# Patient Record
Sex: FEMALE | Race: WHITE | NOT HISPANIC OR LATINO | Employment: FULL TIME | URBAN - METROPOLITAN AREA
[De-identification: names, ages, dates, MRNs, and addresses within clinical notes are randomized per-mention and may not be internally consistent; named-entity substitution may affect disease eponyms.]

---

## 2017-01-04 ENCOUNTER — ROUTINE PRENATAL (OUTPATIENT)
Dept: OBGYN | Facility: CLINIC | Age: 42
End: 2017-01-04
Payer: COMMERCIAL

## 2017-01-04 VITALS — BODY MASS INDEX: 33.84 KG/M2 | DIASTOLIC BLOOD PRESSURE: 70 MMHG | WEIGHT: 179 LBS | SYSTOLIC BLOOD PRESSURE: 110 MMHG

## 2017-01-04 DIAGNOSIS — O09.523 AMA (ADVANCED MATERNAL AGE) MULTIGRAVIDA 35+, THIRD TRIMESTER: ICD-10-CM

## 2017-01-04 PROCEDURE — 90040 PR PRENATAL FOLLOW UP: CPT | Performed by: OBSTETRICS & GYNECOLOGY

## 2017-01-04 NOTE — MR AVS SNAPSHOT
Jose Milligan Maryan   2017 3:30 PM   Routine Prenatal   MRN: [id number]    Department:  [facility]   Dept Phone:  [phone]    Description:  Female : 1975   Provider:  Annika Overton M.D.           Allergies as of 2017     Allergen Noted Reactions    Food 2016       [place] milk       You were diagnosed with     AMA (advanced maternal age) multigravida 35+, third trimester   [[id number]]         Vital Signs     Blood Pressure Weight Last Menstrual Period Smoking Status          110/70 mmHg 81.194 kg (179 lb) 2016 Former Smoker        Basic Information     Date Of Birth Sex Race Ethnicity Preferred Language    1975 Female White Non- English      Your appointments     2017  8:30 AM   OB Follow Up with Annika Overton M.D.   [facility] ([address])    [address] [place] [phone]            2017  1:45 PM   OB Follow Up with Silvia Frost M.D.   [facility] ([address])    [address] [place] [address], [address]  [facility] [address] [phone]            2017 10:45 AM   OB Follow Up with Annika Overton M.D.   [facility] ([address])    [address] [place] [address] [address] [place] [phone]            2017  1:30 PM   OB Follow Up with Annika Overton M.D.   [facility] ([address])    [address] [place] [address], [address] [place] [phone]            2017  1:45 PM   OB Follow Up with Annika Overton M.D.   [facility] ([address])    [address] [place] [address], [address] [place] [address] [phone]              Problem List              ICD-10-CM Priority Class Noted - Resolved    Supervision of other normal pregnancy, antepartum Z34.80   2016 - Present    AMA (advanced maternal age) multigravida 35+ O09.529   2016 - Present    Abnormal   AFP screen O28.0   9/21/2016 - Present    Carpal tunnel syndrome during pregnancy O26.899, G56.00   12/19/2016 - Present      Health Maintenance        Date Due Completion Dates    IMM DTaP/Tdap/Td Vaccine (1 - Tdap) 3/8/1995 ---    MAMMOGRAM 3/8/2016 ---    IMM INFLUENZA (1) 9/1/2016 ---    PAP SMEAR 11/8/2019 11/8/2016, 9/13/2016, 9/13/2016            Current Immunizations     No immunizations on file.      Below and/or attached are the medications your provider expects you to take. Review all of your home medications and newly ordered medications with your provider and/or pharmacist. Follow medication instructions as directed by your provider and/or pharmacist. Please keep your medication list with you and share with your provider. Update the information when medications are discontinued, doses are changed, or new medications (including over-the-counter products) are added; and carry medication information at all times in the event of emergency situations     Allergies:  FOOD - (reactions not documented)               Medications  Valid as of: January 04, 2017 -  4:39 PM    Generic Name Brand Name Tablet Size Instructions for use    Acetaminophen (Tab) TYLENOL 325 MG Take 650 mg by mouth every four hours as needed.        Amoxicillin (Cap) AMOXIL 500 MG Take 1 Cap by mouth 3 times a day.        Azithromycin (Tab) ZITHROMAX 250 MG Take  by mouth. 2 tabs by mouth day 1, 1 tab by mouth days 2-5        Hydrocodone-Chlorpheniramine (Liquid CR) TUSSIONEX 8-10 MG/5ML Take 5 mL by mouth every 12 hours as needed for Cough.        Ibuprofen   Take  by mouth. Motrin 200 mg tabs 2 at 11 am        Multiple Vitamins-Minerals (Tab) THERAGRAN-M  Take 1 Tab by mouth every day.        Verapamil HCl (Tab) ISOPTIN 120 MG Take 120 mg by mouth 3 times a day.        .                 Medicines prescribed today were sent to:     [facility] [place] - [address] [place] [address]    Phone: [phone] Fax:  [phone]    Open 24 Hours?: No      Medication refill instructions:       If your prescription bottle indicates you have medication refills left, it is not necessary to call your provider’s office. Please contact your pharmacy and they will refill your medication.    If your prescription bottle indicates you do not have any refills left, you may request refills at any time through one of the following ways: The online Opzi system (except Urgent Care), by calling your provider’s office, or by asking your pharmacy to contact your provider’s office with a refill request. Medication refills are processed only during regular business hours and may not be available until the next business day. Your provider may request additional information or to have a follow-up visit with you prior to refilling your medication.   *Please Note: Medication refills are assigned a new Rx number when refilled electronically. Your pharmacy may indicate that no refills were authorized even though a new prescription for the same medication is available at the pharmacy. Please request the medicine by name with the pharmacy before contacting your provider for a refill.        Other Notes About Your Plan     Baby girl.           Opzi Access Code: Activation code not generated  Current Opzi Status: Active

## 2017-01-13 ENCOUNTER — OFFICE VISIT (OUTPATIENT)
Dept: URGENT CARE | Facility: CLINIC | Age: 42
End: 2017-01-13
Payer: COMMERCIAL

## 2017-01-13 VITALS
WEIGHT: 182 LBS | SYSTOLIC BLOOD PRESSURE: 110 MMHG | RESPIRATION RATE: 16 BRPM | TEMPERATURE: 99 F | BODY MASS INDEX: 34.41 KG/M2 | DIASTOLIC BLOOD PRESSURE: 72 MMHG | HEART RATE: 101 BPM | OXYGEN SATURATION: 98 %

## 2017-01-13 DIAGNOSIS — J06.9 VIRAL UPPER RESPIRATORY ILLNESS: ICD-10-CM

## 2017-01-13 PROCEDURE — 99213 OFFICE O/P EST LOW 20 MIN: CPT | Performed by: FAMILY MEDICINE

## 2017-01-13 ASSESSMENT — ENCOUNTER SYMPTOMS
FEVER: 0
DOUBLE VISION: 0
SHORTNESS OF BREATH: 1
CHILLS: 1
SWOLLEN GLANDS: 0
ABDOMINAL PAIN: 0
NAUSEA: 0
TROUBLE SWALLOWING: 1
COUGH: 1
NECK PAIN: 0
HEADACHES: 0
VOMITING: 0
BLURRED VISION: 0
HOARSE VOICE: 1
DIZZINESS: 0

## 2017-01-13 NOTE — MR AVS SNAPSHOT
Jose Milligan Maryan   2017 10:00 AM   Office Visit   MRN: [id number]    Department:  [facility]   Dept Phone:  [phone]    Description:  Female : 1975   Provider:  Phillip Barroso M.D.           Reason for Visit     Pharyngitis sore throat,cough x 3 days       Allergies as of 2017     Allergen Noted Reactions    Food 2016       [place] milk       You were diagnosed with     Viral upper respiratory illness   [[id number]]         Vital Signs     Blood Pressure Pulse Temperature Respirations Weight Oxygen Saturation    110/72 mmHg 101 37.2 °C (99 °F) 16 82.555 kg (182 lb) 98%    Last Menstrual Period Smoking Status                2016 Former Smoker          Basic Information     Date Of Birth Sex Race Ethnicity Preferred Language    1975 Female White Non- English      Your appointments     2017  1:45 PM   OB Follow Up with Silvia Frost M.D.   [address])    [address] [place] [phone]   [phone]            2017 10:45 AM   OB Follow Up with Annika Overton M.D.   [address])    [address] [place] [phone]   [phone]            2017  1:30 PM   OB Follow Up with Annika Overton M.D.   [address])    [address] [place] [phone]   [phone]            2017  1:45 PM   OB Follow Up with Annika Overton M.D.   [address])    [address] [place] [address]   [phone]              Problem List              ICD-10-CM Priority Class Noted - Resolved    Supervision of other normal pregnancy, antepartum Z34.80   2016 - Present    AMA (advanced maternal age) multigravida 35+ O09.529   2016 - Present    Abnormal  AFP screen O28.0   2016 - Present    Carpal tunnel syndrome  during pregnancy O26.899, G56.00   12/19/2016 - Present      Health Maintenance        Date Due Completion Dates    IMM DTaP/Tdap/Td Vaccine (1 - Tdap) 3/8/1995 ---    MAMMOGRAM 3/8/2016 ---    IMM INFLUENZA (1) 9/1/2016 ---    PAP SMEAR 11/8/2019 11/8/2016, 9/13/2016, 9/13/2016            Current Immunizations     No immunizations on file.      Below and/or attached are the medications your provider expects you to take. Review all of your home medications and newly ordered medications with your provider and/or pharmacist. Follow medication instructions as directed by your provider and/or pharmacist. Please keep your medication list with you and share with your provider. Update the information when medications are discontinued, doses are changed, or new medications (including over-the-counter products) are added; and carry medication information at all times in the event of emergency situations     Allergies:  FOOD - (reactions not documented)               Medications  Valid as of: January 13, 2017 - 10:34 AM    Generic Name Brand Name Tablet Size Instructions for use    Acetaminophen (Tab) TYLENOL 325 MG Take 650 mg by mouth every four hours as needed.        Amoxicillin (Cap) AMOXIL 500 MG Take 1 Cap by mouth 3 times a day.        Azithromycin (Tab) ZITHROMAX 250 MG Take  by mouth. 2 tabs by mouth day 1, 1 tab by mouth days 2-5        Hydrocodone-Chlorpheniramine (Liquid CR) TUSSIONEX 8-10 MG/5ML Take 5 mL by mouth every 12 hours as needed for Cough.        Ibuprofen   Take  by mouth. Motrin 200 mg tabs 2 at 11 am        Multiple Vitamins-Minerals (Tab) THERAGRAN-M  Take 1 Tab by mouth every day.        Prenatal Vit-Fe Fumarate-FA   Take  by mouth.        Verapamil HCl (Tab) ISOPTIN 120 MG Take 120 mg by mouth 3 times a day.        .                 Medicines prescribed today were sent to:     [facility] [place] [facility] [address] [facility]    [address] [facility] [place] [address]    Phone: [phone]  Fax: [phone]    Open 24 Hours?: No      Medication refill instructions:       If your prescription bottle indicates you have medication refills left, it is not necessary to call your provider’s office. Please contact your pharmacy and they will refill your medication.    If your prescription bottle indicates you do not have any refills left, you may request refills at any time through one of the following ways: The online Alum.ni system (except Urgent Care), by calling your provider’s office, or by asking your pharmacy to contact your provider’s office with a refill request. Medication refills are processed only during regular business hours and may not be available until the next business day. Your provider may request additional information or to have a follow-up visit with you prior to refilling your medication.   *Please Note: Medication refills are assigned a new Rx number when refilled electronically. Your pharmacy may indicate that no refills were authorized even though a new prescription for the same medication is available at the pharmacy. Please request the medicine by name with the pharmacy before contacting your provider for a refill.        Other Notes About Your Plan     Baby girl.           Alum.ni Access Code: Activation code not generated  Current Alum.ni Status: Active

## 2017-01-13 NOTE — PROGRESS NOTES
Subjective:      Jose Steinberg is a 41 y.o. female who presents with Pharyngitis            Pharyngitis   This is a new problem. The current episode started in the past 7 days (3 days). The problem has been gradually worsening. Neither side of throat is experiencing more pain than the other. There has been no fever. The pain is at a severity of 7/10. The pain is moderate. Associated symptoms include congestion, coughing, a hoarse voice, a plugged ear sensation, shortness of breath and trouble swallowing. Pertinent negatives include no abdominal pain, ear discharge, ear pain, headaches, neck pain, swollen glands or vomiting. She has had no exposure to strep. She has tried acetaminophen for the symptoms. The treatment provided mild relief.       Review of Systems   Constitutional: Positive for chills. Negative for fever.   HENT: Positive for congestion, hoarse voice and trouble swallowing. Negative for ear discharge and ear pain.    Eyes: Negative for blurred vision and double vision.   Respiratory: Positive for cough and shortness of breath.    Cardiovascular: Negative for chest pain.   Gastrointestinal: Negative for nausea, vomiting and abdominal pain.   Musculoskeletal: Negative for neck pain.   Neurological: Negative for dizziness and headaches.     PMH:  has a past medical history of Renal calculi and Heart valve disorder. She also has no past medical history of Congestive heart failure (HCC), Cancer (HCC), Infectious disease, COPD, Diabetes, ASTHMA, CAD (coronary artery disease), Stroke (HCC), Seizure disorder (HCC), Hypertension, Liver disease, or Psychiatric disorder.  MEDS:   Current outpatient prescriptions:   •  Prenatal Vit-Fe Fumarate-FA (PRENAPLUS PO), Take  by mouth., Disp: , Rfl:   •  amoxicillin (AMOXIL) 500 MG Cap, Take 1 Cap by mouth 3 times a day., Disp: 30 Cap, Rfl: 0  •  acetaminophen (TYLENOL) 325 MG Tab, Take 650 mg by mouth every four hours as needed., Disp: , Rfl:   •  therapeutic  multivitamin-minerals (THERAGRAN-M) Tab, Take 1 Tab by mouth every day., Disp: , Rfl:   •  verapamil (ISOPTIN) 120 MG TABS, Take 120 mg by mouth 3 times a day., Disp: , Rfl:   •  azithromycin (ZITHROMAX) 250 MG TABS, Take  by mouth. 2 tabs by mouth day 1, 1 tab by mouth days 2-5, Disp: 6 Each, Rfl: 0  •  chlorpheniramine-hydrocodone (TUSSIONEX) 8-10 MG/5ML suspension, Take 5 mL by mouth every 12 hours as needed for Cough., Disp: 50 mL, Rfl: 0  •  MOTRIN PO, Take  by mouth. Motrin 200 mg tabs 2 at 11 am, Disp: , Rfl:   ALLERGIES:   Allergies   Allergen Reactions   • Food      [place] milk      SURGHX:   Past Surgical History   Procedure Laterality Date   • Gyn surgery       freq yeast infections   • Other       kidney stones removed 2003     SOCHX:  reports that she has quit smoking. She does not have any smokeless tobacco history on file. She reports that she does not drink alcohol or use illicit drugs.  FH: Family history was reviewed, no pertinent findings to report       Objective:     /72 mmHg  Pulse 101  Temp(Src) 37.2 °C (99 °F)  Resp 16  Wt 82.555 kg (182 lb)  SpO2 98%  LMP 05/28/2016     Physical Exam   Constitutional: She appears well-developed.   HENT:   Head: Normocephalic.   Right Ear: External ear normal.   Left Ear: External ear normal.   Mouth/Throat: Oropharyngeal exudate present.   Nasal congestion   Eyes: Pupils are equal, round, and reactive to light. Right eye exhibits no discharge. Left eye exhibits no discharge.   Neck: Neck supple. No thyromegaly present.   Cardiovascular: Normal rate.  Exam reveals no friction rub.    No murmur heard.  Pulmonary/Chest: Effort normal. No respiratory distress. She has no wheezes.   Abdominal: Soft. She exhibits no distension. There is no tenderness. There is no guarding.   Lymphadenopathy:     She has no cervical adenopathy.   Neurological: She is alert.   Skin: Skin is warm and dry. No erythema.   Psychiatric: She has a normal mood and affect. Her  behavior is normal.               Assessment/Plan:     1. Viral upper respiratory illness       Supportive care  Push fluids  OTC tylenol  Monitor temperature  Follow-up if symptoms worsen or fail to improve

## 2017-01-20 ENCOUNTER — ROUTINE PRENATAL (OUTPATIENT)
Dept: OBGYN | Facility: CLINIC | Age: 42
End: 2017-01-20
Payer: COMMERCIAL

## 2017-01-20 ENCOUNTER — HOSPITAL ENCOUNTER (OUTPATIENT)
Facility: MEDICAL CENTER | Age: 42
End: 2017-01-20
Attending: OBSTETRICS & GYNECOLOGY
Payer: COMMERCIAL

## 2017-01-20 VITALS — WEIGHT: 180 LBS | DIASTOLIC BLOOD PRESSURE: 72 MMHG | BODY MASS INDEX: 34.03 KG/M2 | SYSTOLIC BLOOD PRESSURE: 114 MMHG

## 2017-01-20 DIAGNOSIS — Z34.83 PRENATAL CARE, SUBSEQUENT PREGNANCY, THIRD TRIMESTER: ICD-10-CM

## 2017-01-20 PROCEDURE — 90040 PR PRENATAL FOLLOW UP: CPT | Performed by: OBSTETRICS & GYNECOLOGY

## 2017-01-20 PROCEDURE — 87653 STREP B DNA AMP PROBE: CPT

## 2017-01-20 NOTE — MR AVS SNAPSHOT
Jose Milligan Maryan   2017 1:45 PM   Routine Prenatal   MRN: [id number]    Department:  [facility]   Dept Phone:  [phone]    Description:  Female : 1975   Provider:  Silvia Frost M.D.           Allergies as of 2017     Allergen Noted Reactions    Food 2016       [place] milk       You were diagnosed with     Prenatal care, subsequent pregnancy, third trimester   [814069]         Vital Signs     Blood Pressure Weight Last Menstrual Period Smoking Status          114/72 mmHg 81.647 kg (180 lb) 2016 Former Smoker        Basic Information     Date Of Birth Sex Race Ethnicity Preferred Language    1975 Female White Non- English      Your appointments     2017 10:45 AM   OB Follow Up with Annika Overton M.D.   [facility] ([address])    [address] [place] [phone]            2017  1:30 PM   OB Follow Up with Annika Overton M.D.   [facility] ([address])    [address] [place] [address], [address] [phone]            2017  1:45 PM   OB Follow Up with Annika Overton M.D.   [facility] ([address])    [address] [place] [address], [address] [phone]              Problem List              ICD-10-CM Priority Class Noted - Resolved    Supervision of other normal pregnancy, antepartum Z34.80   2016 - Present    AMA (advanced maternal age) multigravida 35+ O09.529   2016 - Present    Abnormal  AFP screen O28.0   2016 - Present    Carpal tunnel syndrome during pregnancy O26.899, G56.00   2016 - Present      Health Maintenance        Date Due Completion Dates    IMM DTaP/Tdap/Td Vaccine (1 - Tdap) 3/8/1995 ---    MAMMOGRAM 3/8/2016 ---    IMM INFLUENZA (1) 2016 ---    PAP SMEAR 2019, 2016, 2016            Current Immunizations     No  immunizations on file.      Below and/or attached are the medications your provider expects you to take. Review all of your home medications and newly ordered medications with your provider and/or pharmacist. Follow medication instructions as directed by your provider and/or pharmacist. Please keep your medication list with you and share with your provider. Update the information when medications are discontinued, doses are changed, or new medications (including over-the-counter products) are added; and carry medication information at all times in the event of emergency situations     Allergies:  FOOD - (reactions not documented)               Medications  Valid as of: January 20, 2017 -  2:32 PM    Generic Name Brand Name Tablet Size Instructions for use    Acetaminophen (Tab) TYLENOL 325 MG Take 650 mg by mouth every four hours as needed.        Amoxicillin (Cap) AMOXIL 500 MG Take 1 Cap by mouth 3 times a day.        Azithromycin (Tab) ZITHROMAX 250 MG Take  by mouth. 2 tabs by mouth day 1, 1 tab by mouth days 2-5        Hydrocodone-Chlorpheniramine (Liquid CR) TUSSIONEX 8-10 MG/5ML Take 5 mL by mouth every 12 hours as needed for Cough.        Ibuprofen   Take  by mouth. Motrin 200 mg tabs 2 at 11 am        Multiple Vitamins-Minerals (Tab) THERAGRAN-M  Take 1 Tab by mouth every day.        Prenatal Vit-Fe Fumarate-FA   Take  by mouth.        Verapamil HCl (Tab) ISOPTIN 120 MG Take 120 mg by mouth 3 times a day.        .                 Medicines prescribed today were sent to:     [facility] [address] [place] [address] [address]     [address] [facility] [address]    Phone: [phone] Fax: [phone]    Open 24 Hours?: No      Medication refill instructions:       If your prescription bottle indicates you have medication refills left, it is not necessary to call your provider’s office. Please contact your pharmacy and they will refill your medication.    If your prescription bottle indicates you do not have any  refills left, you may request refills at any time through one of the following ways: The online HomeStars system (except Urgent Care), by calling your provider’s office, or by asking your pharmacy to contact your provider’s office with a refill request. Medication refills are processed only during regular business hours and may not be available until the next business day. Your provider may request additional information or to have a follow-up visit with you prior to refilling your medication.   *Please Note: Medication refills are assigned a new Rx number when refilled electronically. Your pharmacy may indicate that no refills were authorized even though a new prescription for the same medication is available at the pharmacy. Please request the medicine by name with the pharmacy before contacting your provider for a refill.        Your To Do List     Future Labs/Procedures Complete By Expires    GRP B STREP, BY PCR (PALMA BROTH)  As directed 1/20/2018      Other Notes About Your Plan     Baby girl.           HomeStars Access Code: Activation code not generated  Current HomeStars Status: Active

## 2017-01-21 LAB — GP B STREP DNA SPEC QL NAA+PROBE: NEGATIVE

## 2017-01-25 ENCOUNTER — ROUTINE PRENATAL (OUTPATIENT)
Dept: OBGYN | Facility: CLINIC | Age: 42
End: 2017-01-25
Payer: COMMERCIAL

## 2017-01-25 ENCOUNTER — TELEPHONE (OUTPATIENT)
Dept: OBGYN | Facility: CLINIC | Age: 42
End: 2017-01-25

## 2017-01-25 VITALS — DIASTOLIC BLOOD PRESSURE: 80 MMHG | BODY MASS INDEX: 34.41 KG/M2 | SYSTOLIC BLOOD PRESSURE: 110 MMHG | WEIGHT: 182 LBS

## 2017-01-25 LAB
NST ACOUSTIC STIMULATION: NORMAL
NST ACTION NECESSARY: NORMAL
NST ASSESSMENT: NORMAL
NST BASELINE: NORMAL
NST INDICATIONS: NORMAL
NST OTHER DATA: NORMAL
NST READ BY: NORMAL
NST RETURN: NORMAL
NST UTERINE ACTIVITY: NORMAL

## 2017-01-25 PROCEDURE — 90040 PR PRENATAL FOLLOW UP: CPT | Performed by: OBSTETRICS & GYNECOLOGY

## 2017-01-25 PROCEDURE — 59025 FETAL NON-STRESS TEST: CPT | Performed by: OBSTETRICS & GYNECOLOGY

## 2017-01-25 NOTE — MR AVS SNAPSHOT
Jose Milligan Maryan   2017 10:45 AM   Routine Prenatal   MRN: [id number]    Department:  [facility]   Dept Phone:  [phone]    Description:  Female : 1975   Provider:  Annika Overton M.D.           Allergies as of 2017     Allergen Noted Reactions    Food 2016       [place] milk       You were diagnosed with     SGA (small for gestational age)   [118114]         Vital Signs     Blood Pressure Weight Last Menstrual Period Smoking Status          110/80 mmHg 82.555 kg (182 lb) 2016 Former Smoker        Basic Information     Date Of Birth Sex Race Ethnicity Preferred Language    1975 Female White Non- English      Your appointments     2017  9:00 AM   Fetal Non-Stress Test with OBGYN E2 NST ROOM   [facility] ([address])    [address] [place] [address]   [phone]            2017  1:00 PM   Fetal Non-Stress Test with OBGYN E2 NST ROOM   [facility] ([address])    [address] [place] [address]   [phone]            2017  1:30 PM   OB Follow Up with Annika Overton M.D.   [facility] ([address])    [address] [place] [id number] [phone]            2017 10:00 AM   Fetal Non-Stress Test with OBGYN E2 NST ROOM   [facility] ([address])    [address] [place] [address], [address] [place] [address]   [phone]            2017  1:00 PM   Fetal Non-Stress Test with OBGYN E2 NST ROOM   [facility] ([address])    [address] [place] [address], [address] [place] [address]   [phone]            2017  1:45 PM   OB Follow Up with Annika Overton M.D.   [facility] ([address])    [address] [place] [address] [address] [place] [id number] [phone]              Problem List              ICD-10-CM Priority Class Noted  - Resolved    Supervision of other normal pregnancy, antepartum Z34.80   2016 - Present    AMA (advanced maternal age) multigravida 35+ O09.529   2016 - Present    Abnormal  AFP screen O28.0   2016 - Present    Carpal tunnel syndrome during pregnancy O26.899, G56.00   2016 - Present    SGA (small for gestational age) P05.00   2017 - Present      Health Maintenance        Date Due Completion Dates    IMM DTaP/Tdap/Td Vaccine (1 - Tdap) 3/8/1995 ---    MAMMOGRAM 3/8/2016 ---    IMM INFLUENZA (1) 2016 ---    PAP SMEAR 2019, 2016, 2016            Current Immunizations     No immunizations on file.      Below and/or attached are the medications your provider expects you to take. Review all of your home medications and newly ordered medications with your provider and/or pharmacist. Follow medication instructions as directed by your provider and/or pharmacist. Please keep your medication list with you and share with your provider. Update the information when medications are discontinued, doses are changed, or new medications (including over-the-counter products) are added; and carry medication information at all times in the event of emergency situations     Allergies:  FOOD - (reactions not documented)               Medications  Valid as of: 2017 - 11:52 AM    Generic Name Brand Name Tablet Size Instructions for use    Acetaminophen (Tab) TYLENOL 325 MG Take 650 mg by mouth every four hours as needed.        Amoxicillin (Cap) AMOXIL 500 MG Take 1 Cap by mouth 3 times a day.        Azithromycin (Tab) ZITHROMAX 250 MG Take  by mouth. 2 tabs by mouth day 1, 1 tab by mouth days 2-5        Hydrocodone-Chlorpheniramine (Liquid CR) TUSSIONEX 8-10 MG/5ML Take 5 mL by mouth every 12 hours as needed for Cough.        Ibuprofen   Take  by mouth. Motrin 200 mg tabs 2 at 11 am        Multiple Vitamins-Minerals (Tab) THERAGRAN-M  Take 1 Tab by mouth every day.          Prenatal Vit-Fe Fumarate-FA   Take  by mouth.        Verapamil HCl (Tab) ISOPTIN 120 MG Take 120 mg by mouth 3 times a day.        .                 Medicines prescribed today were sent to:     [facility] [address] [facility], [place] - [address] [place] [address]    Phone: [phone] Fax: [phone]    Open 24 Hours?: No      Medication refill instructions:       If your prescription bottle indicates you have medication refills left, it is not necessary to call your provider’s office. Please contact your pharmacy and they will refill your medication.    If your prescription bottle indicates you do not have any refills left, you may request refills at any time through one of the following ways: The online [employer] system (except Urgent Care), by calling your provider’s office, or by asking your pharmacy to contact your provider’s office with a refill request. Medication refills are processed only during regular business hours and may not be available until the next business day. Your provider may request additional information or to have a follow-up visit with you prior to refilling your medication.   *Please Note: Medication refills are assigned a new Rx number when refilled electronically. Your pharmacy may indicate that no refills were authorized even though a new prescription for the same medication is available at the pharmacy. Please request the medicine by name with the pharmacy before contacting your provider for a refill.        Your To Do List     Future Labs/Procedures Complete By Expires    INDUCTION OF LABOR  As directed 1/25/2018      Other Notes About Your Plan     Baby girl.           [employer] Access Code: Activation code not generated  Current [employer] Status: Active

## 2017-01-25 NOTE — TELEPHONE ENCOUNTER
Called [facility] to schedule weekly fluid checks, dopplers, and NST's for patient (IUGR) per Dr. Overton's request.  Was informed that they would need to check with the provider to ask if she could still be seen. Patient has had 3 no shows.  [facility] will call the patient today or tomorrow per Teresa.

## 2017-01-25 NOTE — PROGRESS NOTES
41 y.o.  37w3d The patient is here for routine obstetrical followup. She is without complaints and reports good fetal movement. She denies contractions, vaginal bleeding, or leaking of fluid.    The patient's pregnancy is complicated by   Patient Active Problem List    Diagnosis Date Noted   • SGA (small for gestational age) 2017   • Carpal tunnel syndrome during pregnancy 2016   • Supervision of other normal pregnancy, antepartum 2016   • AMA (advanced maternal age) multigravida 35+ 2016   • Abnormal  AFP screen 2016     Has not followed up with [facility] for weekly dopplers, NST, and KALI.  Was scheduled at [facility] for NSTs, but did not follow up when she changed to this location.     NST today. Will place order for IOL for 39 wks.     Followup in 1 week  Labor precautions were discussed with patient  Fetal kick counts were discussed with patient

## 2017-01-27 ENCOUNTER — TELEPHONE (OUTPATIENT)
Dept: OBGYN | Facility: CLINIC | Age: 42
End: 2017-01-27

## 2017-01-31 ENCOUNTER — ROUTINE PRENATAL (OUTPATIENT)
Dept: OBGYN | Facility: CLINIC | Age: 42
End: 2017-01-31
Payer: COMMERCIAL

## 2017-01-31 LAB
NST ACOUSTIC STIMULATION: NO
NST ACTION NECESSARY: NORMAL
NST ASSESSMENT: REACTIVE
NST BASELINE: 140
NST INDICATIONS: NORMAL
NST OTHER DATA: NORMAL
NST READ BY: NORMAL
NST RETURN: NORMAL
NST UTERINE ACTIVITY: NORMAL

## 2017-01-31 PROCEDURE — 59025 FETAL NON-STRESS TEST: CPT | Performed by: OBSTETRICS & GYNECOLOGY

## 2017-01-31 NOTE — MR AVS SNAPSHOT
Jose Steinberg   2017 9:00 AM   Routine Prenatal   MRN: [id number]    Department:  [facility]   Dept Phone:  [phone]    Description:  Female : 1975   Provider:  Arvind Quiñonez M.D.           Allergies as of 2017     Allergen Noted Reactions    Food 2016       [place] milk       You were diagnosed with     SGA (small for gestational age)   [454824]         Vital Signs     Last Menstrual Period Smoking Status                2016 Former Smoker          Basic Information     Date Of Birth Sex Race Ethnicity Preferred Language    1975 Female White Non- English      Your appointments     2017  1:00 PM   Fetal Non-Stress Test with OBGYN E2 NST ROOM   [facility] ([address])    [address] [place] [phone]            2017  1:30 PM   OB Follow Up with Annika Overton M.D.   [facility] ([address])    [address] [place] [phone] [phone]            2017  8:00 PM   TPC INDUCTION OF LABOR with NON-SURGICAL L&D   LABOR & DELIVERY [facility] (--)    [address] [facility] [address]   [phone]            2017 10:00 AM   Fetal Non-Stress Test with OBGYN E2 NST ROOM   [facility] ([address])    [address] [place] [address], [address] [phone]            2017  1:00 PM   Fetal Non-Stress Test with OBGYN E2 NST ROOM   [facility] ([address])    [address] [place] [phone]            2017  2:00 PM   OB Follow Up with Annika Overton M.D.   [facility] ([address])    [address] [place] [phone]              Problem List              ICD-10-CM Priority Class Noted - Resolved    Supervision of other normal pregnancy, antepartum Z34.80    2016 - Present    AMA (advanced maternal age) multigravida 35+ O09.529   2016 - Present    Abnormal  AFP screen O28.0   2016 - Present    Carpal tunnel syndrome during pregnancy O26.899, G56.00   2016 - Present    SGA (small for gestational age) P05.00   2017 - Present      Health Maintenance        Date Due Completion Dates    IMM DTaP/Tdap/Td Vaccine (1 - Tdap) 3/8/1995 ---    MAMMOGRAM 3/8/2016 ---    IMM INFLUENZA (1) 2016 ---    PAP SMEAR 2019, 2016, 2016            Current Immunizations     No immunizations on file.      Below and/or attached are the medications your provider expects you to take. Review all of your home medications and newly ordered medications with your provider and/or pharmacist. Follow medication instructions as directed by your provider and/or pharmacist. Please keep your medication list with you and share with your provider. Update the information when medications are discontinued, doses are changed, or new medications (including over-the-counter products) are added; and carry medication information at all times in the event of emergency situations     Allergies:  FOOD - (reactions not documented)               Medications  Valid as of: 2017 -  9:47 AM    Generic Name Brand Name Tablet Size Instructions for use    Acetaminophen (Tab) TYLENOL 325 MG Take 650 mg by mouth every four hours as needed.        Amoxicillin (Cap) AMOXIL 500 MG Take 1 Cap by mouth 3 times a day.        Azithromycin (Tab) ZITHROMAX 250 MG Take  by mouth. 2 tabs by mouth day 1, 1 tab by mouth days 2-5        Hydrocodone-Chlorpheniramine (Liquid CR) TUSSIONEX 8-10 MG/5ML Take 5 mL by mouth every 12 hours as needed for Cough.        Ibuprofen   Take  by mouth. Motrin 200 mg tabs 2 at 11 am        Multiple Vitamins-Minerals (Tab) THERAGRAN-M  Take 1 Tab by mouth every day.        Prenatal Vit-Fe Fumarate-FA   Take  by mouth.        Verapamil HCl  (Tab) ISOPTIN 120 MG Take 120 mg by mouth 3 times a day.        .                 Medicines prescribed today were sent to:     [facility] [address] [facility], [place] - [address] [place] [address]    Phone: [phone] Fax: [phone]    Open 24 Hours?: No      Medication refill instructions:       If your prescription bottle indicates you have medication refills left, it is not necessary to call your provider’s office. Please contact your pharmacy and they will refill your medication.    If your prescription bottle indicates you do not have any refills left, you may request refills at any time through one of the following ways: The online [employer] system (except Urgent Care), by calling your provider’s office, or by asking your pharmacy to contact your provider’s office with a refill request. Medication refills are processed only during regular business hours and may not be available until the next business day. Your provider may request additional information or to have a follow-up visit with you prior to refilling your medication.   *Please Note: Medication refills are assigned a new Rx number when refilled electronically. Your pharmacy may indicate that no refills were authorized even though a new prescription for the same medication is available at the pharmacy. Please request the medicine by name with the pharmacy before contacting your provider for a refill.        Other Notes About Your Plan     Baby girl.           [employer] Access Code: Activation code not generated  Current [employer] Status: Active

## 2017-02-02 ENCOUNTER — ROUTINE PRENATAL (OUTPATIENT)
Dept: OBGYN | Facility: CLINIC | Age: 42
End: 2017-02-02
Payer: COMMERCIAL

## 2017-02-02 VITALS — BODY MASS INDEX: 34.22 KG/M2 | WEIGHT: 181 LBS | DIASTOLIC BLOOD PRESSURE: 62 MMHG | SYSTOLIC BLOOD PRESSURE: 118 MMHG

## 2017-02-02 DIAGNOSIS — O09.523 AMA (ADVANCED MATERNAL AGE) MULTIGRAVIDA 35+, THIRD TRIMESTER: ICD-10-CM

## 2017-02-02 PROCEDURE — 90040 PR PRENATAL FOLLOW UP: CPT | Performed by: OBSTETRICS & GYNECOLOGY

## 2017-02-02 PROCEDURE — 59025 FETAL NON-STRESS TEST: CPT | Performed by: OBSTETRICS & GYNECOLOGY

## 2017-02-02 NOTE — PROGRESS NOTES
41 y.o.  38w4d The patient is here for routine obstetrical followup. She is without complaints and reports good fetal movement. She denies contractions, vaginal bleeding, or leaking of fluid.    The patient's pregnancy is complicated by   Patient Active Problem List    Diagnosis Date Noted   • SGA (small for gestational age) 2017   • Carpal tunnel syndrome during pregnancy 2016   • Supervision of other normal pregnancy, antepartum 2016   • AMA (advanced maternal age) multigravida 35+ 2016   • Abnormal  AFP screen 2016     Followed for SGA per [facility] earlier in pregnancy.  Pt was noncompliant and missed 3 visits with them and they refuse to see her back.  Unable to get f/u growth scan.    Limited US performed for KALI today.  Position cephalic.  KALI = 10.1 cm    Scheduled for IOL due to SGA/possible IUGR on 17 at 8 pm.     Followup for IOL  Labor precautions were discussed with patient  Fetal kick counts were discussed with patient

## 2017-02-02 NOTE — MR AVS SNAPSHOT
Jose Milligan Maryan   2017 1:30 PM   Routine Prenatal   MRN: [id number]    Department:  [facility]   Dept Phone:  [phone]    Description:  Female : 1975   Provider:  Annika Overton M.D.           Allergies as of 2017     Allergen Noted Reactions    Food 2016       [place] milk       You were diagnosed with     AMA (advanced maternal age) multigravida 35+, third trimester   [[id number]]         Vital Signs     Blood Pressure Weight Last Menstrual Period Smoking Status          118/62 mmHg 82.101 kg (181 lb) 2016 Former Smoker        Basic Information     Date Of Birth Sex Race Ethnicity Preferred Language    1975 Female White Non- English      Your appointments     2017  8:00 PM   TPC INDUCTION OF LABOR with NON-SURGICAL L&D   [facility]    [address] [place] [id number] [phone]              Problem List              ICD-10-CM Priority Class Noted - Resolved    Supervision of other normal pregnancy, antepartum Z34.80   2016 - Present    AMA (advanced maternal age) multigravida 35+ O09.529   2016 - Present    Abnormal  AFP screen O28.0   2016 - Present    Carpal tunnel syndrome during pregnancy O26.899, G56.00   2016 - Present    SGA (small for gestational age) P05.00   2017 - Present      Health Maintenance        Date Due Completion Dates    IMM DTaP/Tdap/Td Vaccine (1 - Tdap) 3/8/1995 ---    MAMMOGRAM 3/8/2016 ---    IMM INFLUENZA (1) 2016 ---    PAP SMEAR 2019, 2016, 2016            Current Immunizations     No immunizations on file.      Below and/or attached are the medications your provider expects you to take. Review all of your home medications and newly ordered medications with your provider and/or pharmacist. Follow medication instructions as directed by your provider and/or pharmacist. Please keep your medication list with you and share with your  provider. Update the information when medications are discontinued, doses are changed, or new medications (including over-the-counter products) are added; and carry medication information at all times in the event of emergency situations     Allergies:  FOOD - (reactions not documented)               Medications  Valid as of: February 02, 2017 -  3:21 PM    Generic Name Brand Name Tablet Size Instructions for use    Acetaminophen (Tab) TYLENOL 325 MG Take 650 mg by mouth every four hours as needed.        Amoxicillin (Cap) AMOXIL 500 MG Take 1 Cap by mouth 3 times a day.        Azithromycin (Tab) ZITHROMAX 250 MG Take  by mouth. 2 tabs by mouth day 1, 1 tab by mouth days 2-5        Hydrocodone-Chlorpheniramine (Liquid CR) TUSSIONEX 8-10 MG/5ML Take 5 mL by mouth every 12 hours as needed for Cough.        Ibuprofen   Take  by mouth. Motrin 200 mg tabs 2 at 11 am        Multiple Vitamins-Minerals (Tab) THERAGRAN-M  Take 1 Tab by mouth every day.        Prenatal Vit-Fe Fumarate-FA   Take  by mouth.        Verapamil HCl (Tab) ISOPTIN 120 MG Take 120 mg by mouth 3 times a day.        .                 Medicines prescribed today were sent to:     [facility] [address] [place] [address] - [address] [facility]    [address] [place] [address]    Phone: [phone] Fax: [phone]    Open 24 Hours?: No      Medication refill instructions:       If your prescription bottle indicates you have medication refills left, it is not necessary to call your provider’s office. Please contact your pharmacy and they will refill your medication.    If your prescription bottle indicates you do not have any refills left, you may request refills at any time through one of the following ways: The online [employer] system (except Urgent Care), by calling your provider’s office, or by asking your pharmacy to contact your provider’s office with a refill request. Medication refills are processed only during regular business hours and may not be available until  the next business day. Your provider may request additional information or to have a follow-up visit with you prior to refilling your medication.   *Please Note: Medication refills are assigned a new Rx number when refilled electronically. Your pharmacy may indicate that no refills were authorized even though a new prescription for the same medication is available at the pharmacy. Please request the medicine by name with the pharmacy before contacting your provider for a refill.        Other Notes About Your Plan     Baby girl.           [employer] Access Code: Activation code not generated  Current [employer] Status: Active

## 2017-02-02 NOTE — MR AVS SNAPSHOT
Jose Milligan Maryan   2017 1:00 PM   Routine Prenatal   MRN: [id number]    Department:  [facility]   Dept Phone:  [phone]    Description:  Female : 1975   Provider:  Annika Overton M.D.           Allergies as of 2017     Allergen Noted Reactions    Food 2016       [place] milk       You were diagnosed with     SGA (small for gestational age)   [783782]         Vital Signs     Last Menstrual Period Smoking Status                2016 Former Smoker          Basic Information     Date Of Birth Sex Race Ethnicity Preferred Language    1975 Female White Non- English      Your appointments     2017  8:00 PM   TPC INDUCTION OF LABOR with NON-SURGICAL L&D   [facility])    [address] [place] [address] [phone]              Problem List              ICD-10-CM Priority Class Noted - Resolved    Supervision of other normal pregnancy, antepartum Z34.80   2016 - Present    AMA (advanced maternal age) multigravida 35+ O09.529   2016 - Present    Abnormal  AFP screen O28.0   2016 - Present    Carpal tunnel syndrome during pregnancy O26.899, G56.00   2016 - Present    SGA (small for gestational age) P05.00   2017 - Present      Health Maintenance        Date Due Completion Dates    IMM DTaP/Tdap/Td Vaccine (1 - Tdap) 3/8/1995 ---    MAMMOGRAM 3/8/2016 ---    IMM INFLUENZA (1) 2016 ---    PAP SMEAR 2019, 2016, 2016            Results     POCT Fetal Nonstress Test      Component    NST Indications    SGA    NST Baseline    120s    NST Uterine Activity    none    NST Acoustic Stimulation    NST Assessment    Cat 1    NST Action Necessary    NST Other Data    NST Return    NST Read By    Tian                        Current Immunizations     No immunizations on file.      Below and/or attached are the medications your provider expects you to take. Review all of your home  medications and newly ordered medications with your provider and/or pharmacist. Follow medication instructions as directed by your provider and/or pharmacist. Please keep your medication list with you and share with your provider. Update the information when medications are discontinued, doses are changed, or new medications (including over-the-counter products) are added; and carry medication information at all times in the event of emergency situations     Allergies:  FOOD - (reactions not documented)               Medications  Valid as of: February 02, 2017 -  3:20 PM    Generic Name Brand Name Tablet Size Instructions for use    Acetaminophen (Tab) TYLENOL 325 MG Take 650 mg by mouth every four hours as needed.        Amoxicillin (Cap) AMOXIL 500 MG Take 1 Cap by mouth 3 times a day.        Azithromycin (Tab) ZITHROMAX 250 MG Take  by mouth. 2 tabs by mouth day 1, 1 tab by mouth days 2-5        Hydrocodone-Chlorpheniramine (Liquid CR) TUSSIONEX 8-10 MG/5ML Take 5 mL by mouth every 12 hours as needed for Cough.        Ibuprofen   Take  by mouth. Motrin 200 mg tabs 2 at 11 am        Multiple Vitamins-Minerals (Tab) THERAGRAN-M  Take 1 Tab by mouth every day.        Prenatal Vit-Fe Fumarate-FA   Take  by mouth.        Verapamil HCl (Tab) ISOPTIN 120 MG Take 120 mg by mouth 3 times a day.        .                 Medicines prescribed today were sent to:     [facility] [address] [place], [place] [address] [address] [facility] [place] [address]    Phone: [phone] Fax: [phone]    Open 24 Hours?: No      Medication refill instructions:       If your prescription bottle indicates you have medication refills left, it is not necessary to call your provider’s office. Please contact your pharmacy and they will refill your medication.    If your prescription bottle indicates you do not have any refills left, you may request refills at any time through one of the following ways: The online [employer] system (except Urgent  Care), by calling your provider’s office, or by asking your pharmacy to contact your provider’s office with a refill request. Medication refills are processed only during regular business hours and may not be available until the next business day. Your provider may request additional information or to have a follow-up visit with you prior to refilling your medication.   *Please Note: Medication refills are assigned a new Rx number when refilled electronically. Your pharmacy may indicate that no refills were authorized even though a new prescription for the same medication is available at the pharmacy. Please request the medicine by name with the pharmacy before contacting your provider for a refill.        Other Notes About Your Plan     Baby girl.           [employer] Access Code: Activation code not generated  Current [employer] Status: Active

## 2017-02-05 ENCOUNTER — HOSPITAL ENCOUNTER (INPATIENT)
Facility: MEDICAL CENTER | Age: 42
LOS: 2 days | End: 2017-02-07
Attending: OBSTETRICS & GYNECOLOGY | Admitting: OBSTETRICS & GYNECOLOGY
Payer: COMMERCIAL

## 2017-02-05 PROBLEM — Z91.199 NONCOMPLIANT PREGNANT PATIENT IN THIRD TRIMESTER: Status: ACTIVE | Noted: 2017-02-05

## 2017-02-05 PROBLEM — O09.893 NONCOMPLIANT PREGNANT PATIENT IN THIRD TRIMESTER: Status: ACTIVE | Noted: 2017-02-05

## 2017-02-05 LAB
BASOPHILS # BLD AUTO: 0.6 % (ref 0–1.8)
BASOPHILS # BLD: 0.08 K/UL (ref 0–0.12)
EOSINOPHIL # BLD AUTO: 0.12 K/UL (ref 0–0.51)
EOSINOPHIL NFR BLD: 0.9 % (ref 0–6.9)
ERYTHROCYTE [DISTWIDTH] IN BLOOD BY AUTOMATED COUNT: 46.3 FL (ref 35.9–50)
HCT VFR BLD AUTO: 39.7 % (ref 37–47)
HGB BLD-MCNC: 14.1 G/DL (ref 12–16)
HOLDING TUBE BB 8507: NORMAL
IMM GRANULOCYTES # BLD AUTO: 0.12 K/UL (ref 0–0.11)
IMM GRANULOCYTES NFR BLD AUTO: 0.9 % (ref 0–0.9)
LYMPHOCYTES # BLD AUTO: 2.18 K/UL (ref 1–4.8)
LYMPHOCYTES NFR BLD: 16.5 % (ref 22–41)
MCH RBC QN AUTO: 32.7 PG (ref 27–33)
MCHC RBC AUTO-ENTMCNC: 35.5 G/DL (ref 33.6–35)
MCV RBC AUTO: 92.1 FL (ref 81.4–97.8)
MONOCYTES # BLD AUTO: 0.9 K/UL (ref 0–0.85)
MONOCYTES NFR BLD AUTO: 6.8 % (ref 0–13.4)
NEUTROPHILS # BLD AUTO: 9.83 K/UL (ref 2–7.15)
NEUTROPHILS NFR BLD: 74.3 % (ref 44–72)
NRBC # BLD AUTO: 0 K/UL
NRBC BLD AUTO-RTO: 0 /100 WBC
PLATELET # BLD AUTO: 286 K/UL (ref 164–446)
PMV BLD AUTO: 9.3 FL (ref 9–12.9)
RBC # BLD AUTO: 4.31 M/UL (ref 4.2–5.4)
WBC # BLD AUTO: 13.2 K/UL (ref 4.8–10.8)

## 2017-02-05 PROCEDURE — 700111 HCHG RX REV CODE 636 W/ 250 OVERRIDE (IP): Performed by: OBSTETRICS & GYNECOLOGY

## 2017-02-05 PROCEDURE — 85025 COMPLETE CBC W/AUTO DIFF WBC: CPT

## 2017-02-05 PROCEDURE — 770002 HCHG ROOM/CARE - OB PRIVATE (112)

## 2017-02-05 PROCEDURE — 3E033VJ INTRODUCTION OF OTHER HORMONE INTO PERIPHERAL VEIN, PERCUTANEOUS APPROACH: ICD-10-PCS | Performed by: OBSTETRICS & GYNECOLOGY

## 2017-02-05 RX ORDER — MISOPROSTOL 200 UG/1
800 TABLET ORAL
Status: DISCONTINUED | OUTPATIENT
Start: 2017-02-05 | End: 2017-02-06 | Stop reason: HOSPADM

## 2017-02-05 RX ORDER — METHYLERGONOVINE MALEATE 0.2 MG/ML
0.2 INJECTION INTRAVENOUS
Status: DISCONTINUED | OUTPATIENT
Start: 2017-02-05 | End: 2017-02-06 | Stop reason: HOSPADM

## 2017-02-05 RX ORDER — SODIUM CHLORIDE, SODIUM LACTATE, POTASSIUM CHLORIDE, CALCIUM CHLORIDE 600; 310; 30; 20 MG/100ML; MG/100ML; MG/100ML; MG/100ML
INJECTION, SOLUTION INTRAVENOUS CONTINUOUS
Status: DISCONTINUED | OUTPATIENT
Start: 2017-02-05 | End: 2017-02-06

## 2017-02-05 RX ORDER — ALUMINA, MAGNESIA, AND SIMETHICONE 2400; 2400; 240 MG/30ML; MG/30ML; MG/30ML
30 SUSPENSION ORAL EVERY 6 HOURS PRN
Status: DISCONTINUED | OUTPATIENT
Start: 2017-02-05 | End: 2017-02-06 | Stop reason: HOSPADM

## 2017-02-05 RX ORDER — HYDROXYZINE 50 MG/1
50 TABLET, FILM COATED ORAL EVERY 6 HOURS PRN
Status: DISCONTINUED | OUTPATIENT
Start: 2017-02-05 | End: 2017-02-06 | Stop reason: HOSPADM

## 2017-02-05 RX ORDER — TERBUTALINE SULFATE 1 MG/ML
0.25 INJECTION, SOLUTION SUBCUTANEOUS PRN
Status: DISCONTINUED | OUTPATIENT
Start: 2017-02-05 | End: 2017-02-06 | Stop reason: HOSPADM

## 2017-02-05 RX ADMIN — SODIUM CHLORIDE, POTASSIUM CHLORIDE, SODIUM LACTATE AND CALCIUM CHLORIDE: 600; 310; 30; 20 INJECTION, SOLUTION INTRAVENOUS at 21:28

## 2017-02-05 RX ADMIN — Medication 1 MILLI-UNITS/MIN: at 21:10

## 2017-02-05 ASSESSMENT — LIFESTYLE VARIABLES
DO YOU DRINK ALCOHOL: NO
ALCOHOL_USE: NO
PACK_YEARS: 20
EVER_SMOKED: YES

## 2017-02-05 ASSESSMENT — COPD QUESTIONNAIRES
DURING THE PAST 4 WEEKS HOW MUCH DID YOU FEEL SHORT OF BREATH: NONE/LITTLE OF THE TIME
HAVE YOU SMOKED AT LEAST 100 CIGARETTES IN YOUR ENTIRE LIFE: NO/DON'T KNOW
COPD SCREENING SCORE: 0
DO YOU EVER COUGH UP ANY MUCUS OR PHLEGM?: NO/ONLY WITH OCCASIONAL COLDS OR INFECTIONS

## 2017-02-05 NOTE — IP AVS SNAPSHOT
2/7/2017          Jose Steinberg  [address]  [facility] [id number]    Dear Jose:    [facility] wants to ensure your discharge home is safe and you or your loved ones have had all your questions answered regarding your care after you leave the hospital.    You may receive a telephone call within two days of your discharge.  This call is to make certain you understand your discharge instructions as well as ensure we provided you with the best care possible during your stay with us.     The call will only last approximately 3-5 minutes and will be done by a nurse.    Once again, we want to ensure your discharge home is safe and that you have a clear understanding of any next steps in your care.  If you have any questions or concerns, please do not hesitate to contact us, we are here for you.  Thank you for choosing [facility] for your healthcare needs.    Sincerely,    Edy Salgado    [facility]

## 2017-02-05 NOTE — IP AVS SNAPSHOT
Home Care Instructions                                                                                                                Jose Steinberg   MRN: [id number]    Department:  POST PARTUM 31   2017           Follow-up Information     1. [facility] with [facility], M.D.. Schedule an appointment as soon as possible for a visit in 5 weeks.    Specialty:  OB/Gyn    Why:  OB check     Contact information    [address] [place] [address]  ALEX  Russ NIELSEN [address]  [phone]         I assume responsibility for securing a follow-up [place] Screening blood test on my baby within the specified date range.    -                  Discharge Instructions       POSTPARTUM DISCHARGE INSTRUCTIONS FOR MOM    YOB: 1975   Age: 41 y.o.               Admit Date: 2017     Discharge Date: 2017  Attending Doctor:  Annika Overton M.D.                  Allergies:  Food    Discharged to home by car. Discharged via wheelchair, hospital escort: Yes.  Special equipment needed: Not Applicable  Belongings with: Personal  Be sure to schedule a follow-up appointment with your primary care doctor or any specialists as instructed.     Discharge Plan:   Diet Plan: Discussed  Activity Level: Discussed  Smoking Cessation Offered: Patient Refused  Confirmed Follow up Appointment: Patient to Call and Schedule Appointment  Confirmed Symptoms Management: Discussed  Medication Reconciliation Updated: Yes  Influenza Vaccine Indication: Patient Refuses    REASONS TO CALL YOUR OBSTETRICIAN:  1.   Persistent fever or shaking chills (Temperature higher than 100.4)  2.   Heavy bleeding (soaking more than 1 pad per hour); Passing clots  3.   Foul odor from vagina  4.   Mastitis (Breast infection; breast pain, chills, fever, redness)  5.   Urinary pain, burning or frequency  6.   Episiotomy infection  7.   Abdominal incision infection  8.   Severe depression longer than 24 hours    HAND WASHING  · Prior to handling the baby.  · Before  "breastfeeding or bottle feeding baby.  · After using the bathroom or changing the baby's diaper.    WOUND CARE  Ask your physician for additional care instructions.  In general:    ·  Incision:      · Keep clean and dry.    · Do NOT lift anything heavier than your baby for up to 6 weeks.    · There should not be any opening or pus.      VAGINAL CARE  · Nothing inside vagina for 6 weeks: no sexual intercourse, tampons or douching.  · Bleeding may continue for 2-4 weeks.  Amount may vary.    · Call your physician for heavy bleeding which means soaking more than 1 pad per hour    BIRTH CONTROL  · It is possible to become pregnant at any time after delivery and while breastfeeding.  · Plan to discuss a method of birth control with your physician at your follow up visit. visit.    DIET AND ELIMINATION  · Eating more fiber (bran cereal, fruits, and vegetables) and drinking plenty of fluids will help to avoid constipation.  · Urinary frequency after childbirth is normal.    POSTPARTUM BLUES  During the first few days after birth, you may experience a sense of the \"blues\" which may include impatience, irritability or even crying.  These feeling come and go quickly.  However, as many as 1 in 10 women experience emotional symptoms known as postpartum depression.    Postpartum depression:  May start as early as the second or third day after delivery or take several weeks or months to develop.  Symptoms of \"blues\" are present, but are more intense:  Crying spells; loss of appetite; feelings of hopelessness or loss of control; fear of touching the baby; over concern or no concern at all about the baby; little or no concern about your own appearance/caring for yourself; and/or inability to sleep or excessive sleeping.  Contact your physician if you are experiencing any of these symptoms.    Crisis Hotline:  · National Crisis Hotline:  1-007-JQPMHGC  Or 1-597.623.8327  · [place] Crisis Hotline:  1-324.852.3086  Or " "456.715.3999    PREVENTING SHAKEN BABY:  If you are angry or stressed, PUT THE BABY IN THE CRIB, step away, take some deep breaths, and wait until you are calm to care for the baby.  DO NOT SHAKE THE BABY.  You are not alone, call a supporter for help.    · Crisis Call Center 24/7 crisis line 864-722-7797 or 1-391.100.4801  · You can also text them, text \"ANSWER\" to 358228    QUIT SMOKING/TOBACCO USE:  I understand the use of any tobacco products increases my chance of suffering from future heart disease and could cause other illnesses which may shorten my life. Quitting the use of tobacco products is the single most important thing I can do to improve my health. For further information on smoking / tobacco cessation call a Toll Free Quit Line at 1-841.418.3629 (*National Cancer [place]) or 1-492.207.8199 (American Lung Association) or you can access the web based program at www.lungusa.org.    · [place] Tobacco Users Help Line:  (679) 111-1713       Toll Free: 1-412.193.7849  · Quit Tobacco Program [facility] (826)971-4685    DEPRESSION / SUICIDE RISK:  As you are discharged from this [facility], it is important to learn how to keep safe from harming yourself.    Recognize the warning signs:  · Abrupt changes in personality, positive or negative- including increase in energy   · Giving away possessions  · Change in eating patterns- significant weight changes-  positive or negative  · Change in sleeping patterns- unable to sleep or sleeping all the time   · Unwillingness or inability to communicate  · Depression  · Unusual sadness, discouragement and loneliness  · Talk of wanting to die  · Neglect of personal appearance   · Rebelliousness- reckless behavior  · Withdrawal from people/activities they love  · Confusion- inability to concentrate     If you or a loved one observes any of these behaviors or has concerns about self-harm, here's what you can do:  · Talk about it- your " feelings and reasons for harming yourself  · Remove any means that you might use to hurt yourself (examples: pills, rope, extension cords, firearm)  · Get professional help from the community (Mental Health, Substance Abuse, psychological counseling)  · Do not be alone:Call your Safe Contact- someone whom you trust who will be there for you.  · Call your local CRISIS HOTLINE 403-7159 or 921-121-0706  · Call your local Children's Mobile Crisis Response Team [place] (129) 209-4685 or wwwOwlr  · Call the toll free National Suicide Prevention Hotlines   · National Suicide Prevention Lifeline 318-691-RMIP (1204)  · National Hope Line Network 800-SUICIDE (422-1116)    DISCHARGE SURVEY:  Thank you for choosing [facility].  We hope we provided you with very good care.  You may be receiving a survey in the mail.  Please fill it out.  Your opinion is valuable to us.    ADDITIONAL EDUCATIONAL MATERIALS GIVEN TO PATIENT:        My signature on this form indicates that:  1.  I have reviewed and understand the above information  2.  My questions regarding this information have been answered to my satisfaction.  3.  I have formulated a plan with my discharge nurse to obtain my prescribed medication for home.         Discharge Medication Instructions:    Below are the medications your physician expects you to take upon discharge:    Review all your home medications and newly ordered medications with your doctor and/or pharmacist. Follow medication instructions as directed by your doctor and/or pharmacist.    Please keep your medication list with you and share with your physician.               Medication List      START taking these medications        Instructions     MG Caps   Last time this was given:  100 mg on 2/7/2017 10:25 AM    Take 100 mg by mouth 2 times a day as needed for Constipation.   Dose:  100 mg       ibuprofen 600 MG Tabs   Last time this was given:  600 mg on 2/7/2017  6:10 AM      Commonly known as:  MOTRIN    Take 1 Tab by mouth every 6 hours as needed (Cramping).   Dose:  600 mg       oxycodone-acetaminophen 5-325 MG Tabs   Last time this was given:  2 Tabs on 2/7/2017 10:29 AM   Commonly known as:  PERCOCET    Take 1 Tab by mouth every four hours as needed for Moderate Pain ((Pain Scale 4-6); If acetaminophen ineffective).   Dose:  1 Tab         CONTINUE taking these medications        Instructions    PRENAPLUS PO   Last time this was given:  1 Tab on 2/7/2017 10:24 AM    Take  by mouth.               Crisis Hotline:     [place] Crisis Hotline:  0-300-ZHSFENJ or 1-195.949.3089    [place] Crisis Hotline:    1-373.332.7798 or 414-396-0312        Disclaimer           _____________________________________                     __________       ________       Patient/Mother Signature or Legal                          Date                   Time

## 2017-02-05 NOTE — IP AVS SNAPSHOT
[employer] Access Code: Activation code not generated  Current [employer] Status: Active    [employer]  A secure, online tool to manage your health information     [employer]’s [employer]® is a secure, online tool that connects you to your personalized health information from the privacy of your home -- day or night - making it very easy for you to manage your healthcare. Once the activation process is completed, you can even access your medical information using the [employer] coreen, which is available for free in the Apple Coreen store or Google Play store.     [employer] provides the following levels of access (as shown below):   My Chart Features   [facility] Primary Care Doctor [facility]  Specialists [facility]  Urgent  Care Non-[facility]  Primary Care  Doctor   Email your healthcare team securely and privately 24/7 X X X X   Manage appointments: schedule your next appointment; view details of past/upcoming appointments X      Request prescription refills. X      View recent personal medical records, including lab and immunizations X X X X   View health record, including health history, allergies, medications X X X X   Read reports about your outpatient visits, procedures, consult and ER notes X X X X   See your discharge summary, which is a recap of your hospital and/or ER visit that includes your diagnosis, lab results, and care plan. X X       How to register for [employer]:  1. Go to  [email or web address].  2. Click on the Sign Up Now box, which takes you to the New Member Sign Up page. You will need to provide the following information:  a. Enter your [employer] Access Code exactly as it appears at the top of this page. (You will not need to use this code after you’ve completed the sign-up process. If you do not sign up before the expiration date, you must request a new code.)   b. Enter your date of birth.   c. Enter your home email address.   d. Click Submit, and follow the next screen’s instructions.  3. Create a [employer] ID. This will  be your Blip login ID and cannot be changed, so think of one that is secure and easy to remember.  4. Create a Blip password. You can change your password at any time.  5. Enter your Password Reset Question and Answer. This can be used at a later time if you forget your password.   6. Enter your e-mail address. This allows you to receive e-mail notifications when new information is available in Blip.  7. Click Sign Up. You can now view your health information.    For assistance activating your Blip account, call [phone]

## 2017-02-06 PROCEDURE — A9270 NON-COVERED ITEM OR SERVICE: HCPCS | Performed by: OBSTETRICS & GYNECOLOGY

## 2017-02-06 PROCEDURE — 700111 HCHG RX REV CODE 636 W/ 250 OVERRIDE (IP): Performed by: OBSTETRICS & GYNECOLOGY

## 2017-02-06 PROCEDURE — 10907ZC DRAINAGE OF AMNIOTIC FLUID, THERAPEUTIC FROM PRODUCTS OF CONCEPTION, VIA NATURAL OR ARTIFICIAL OPENING: ICD-10-PCS | Performed by: OBSTETRICS & GYNECOLOGY

## 2017-02-06 PROCEDURE — 700112 HCHG RX REV CODE 229: Performed by: OBSTETRICS & GYNECOLOGY

## 2017-02-06 PROCEDURE — 59409 OBSTETRICAL CARE: CPT

## 2017-02-06 PROCEDURE — 303615 HCHG EPIDURAL/SPINAL ANESTHESIA FOR LABOR

## 2017-02-06 PROCEDURE — 304965 HCHG RECOVERY SERVICES

## 2017-02-06 PROCEDURE — 700111 HCHG RX REV CODE 636 W/ 250 OVERRIDE (IP)

## 2017-02-06 PROCEDURE — 36415 COLL VENOUS BLD VENIPUNCTURE: CPT

## 2017-02-06 PROCEDURE — 700102 HCHG RX REV CODE 250 W/ 637 OVERRIDE(OP): Performed by: OBSTETRICS & GYNECOLOGY

## 2017-02-06 PROCEDURE — 770002 HCHG ROOM/CARE - OB PRIVATE (112)

## 2017-02-06 RX ORDER — SODIUM CHLORIDE, SODIUM LACTATE, POTASSIUM CHLORIDE, CALCIUM CHLORIDE 600; 310; 30; 20 MG/100ML; MG/100ML; MG/100ML; MG/100ML
INJECTION, SOLUTION INTRAVENOUS
Status: DISCONTINUED
Start: 2017-02-06 | End: 2017-02-06

## 2017-02-06 RX ORDER — HYDROCODONE BITARTRATE AND ACETAMINOPHEN 5; 325 MG/1; MG/1
1 TABLET ORAL EVERY 4 HOURS PRN
Status: DISCONTINUED | OUTPATIENT
Start: 2017-02-06 | End: 2017-02-06

## 2017-02-06 RX ORDER — HYDROCODONE BITARTRATE AND ACETAMINOPHEN 10; 325 MG/1; MG/1
1 TABLET ORAL EVERY 4 HOURS PRN
Status: DISCONTINUED | OUTPATIENT
Start: 2017-02-06 | End: 2017-02-06

## 2017-02-06 RX ORDER — ROPIVACAINE HYDROCHLORIDE 2 MG/ML
INJECTION, SOLUTION EPIDURAL; INFILTRATION; PERINEURAL
Status: COMPLETED
Start: 2017-02-06 | End: 2017-02-06

## 2017-02-06 RX ORDER — IBUPROFEN 600 MG/1
600 TABLET ORAL EVERY 6 HOURS PRN
Status: DISCONTINUED | OUTPATIENT
Start: 2017-02-06 | End: 2017-02-06

## 2017-02-06 RX ORDER — ONDANSETRON 2 MG/ML
4 INJECTION INTRAMUSCULAR; INTRAVENOUS EVERY 6 HOURS PRN
Status: DISCONTINUED | OUTPATIENT
Start: 2017-02-06 | End: 2017-02-06

## 2017-02-06 RX ORDER — OXYCODONE HYDROCHLORIDE AND ACETAMINOPHEN 5; 325 MG/1; MG/1
2 TABLET ORAL EVERY 4 HOURS PRN
Status: DISCONTINUED | OUTPATIENT
Start: 2017-02-06 | End: 2017-02-07 | Stop reason: HOSPADM

## 2017-02-06 RX ORDER — MISOPROSTOL 200 UG/1
800 TABLET ORAL PRN
Status: DISCONTINUED | OUTPATIENT
Start: 2017-02-06 | End: 2017-02-07 | Stop reason: HOSPADM

## 2017-02-06 RX ORDER — ONDANSETRON 4 MG/1
4 TABLET, ORALLY DISINTEGRATING ORAL EVERY 6 HOURS PRN
Status: DISCONTINUED | OUTPATIENT
Start: 2017-02-06 | End: 2017-02-06

## 2017-02-06 RX ORDER — VITAMIN A ACETATE, BETA CAROTENE, ASCORBIC ACID, CHOLECALCIFEROL, .ALPHA.-TOCOPHEROL ACETATE, DL-, THIAMINE MONONITRATE, RIBOFLAVIN, NIACINAMIDE, PYRIDOXINE HYDROCHLORIDE, FOLIC ACID, CYANOCOBALAMIN, CALCIUM CARBONATE, FERROUS FUMARATE, ZINC OXIDE, CUPRIC OXIDE 3080; 12; 120; 400; 1; 1.84; 3; 20; 22; 920; 25; 200; 27; 10; 2 [IU]/1; UG/1; MG/1; [IU]/1; MG/1; MG/1; MG/1; MG/1; MG/1; [IU]/1; MG/1; MG/1; MG/1; MG/1; MG/1
1 TABLET, FILM COATED ORAL EVERY MORNING
Status: DISCONTINUED | OUTPATIENT
Start: 2017-02-07 | End: 2017-02-07 | Stop reason: HOSPADM

## 2017-02-06 RX ORDER — OXYCODONE HYDROCHLORIDE AND ACETAMINOPHEN 5; 325 MG/1; MG/1
1 TABLET ORAL EVERY 4 HOURS PRN
Status: DISCONTINUED | OUTPATIENT
Start: 2017-02-06 | End: 2017-02-07 | Stop reason: HOSPADM

## 2017-02-06 RX ORDER — IBUPROFEN 600 MG/1
600 TABLET ORAL EVERY 6 HOURS PRN
Status: DISCONTINUED | OUTPATIENT
Start: 2017-02-06 | End: 2017-02-07 | Stop reason: HOSPADM

## 2017-02-06 RX ORDER — DEXTROSE, SODIUM CHLORIDE, SODIUM LACTATE, POTASSIUM CHLORIDE, AND CALCIUM CHLORIDE 5; .6; .31; .03; .02 G/100ML; G/100ML; G/100ML; G/100ML; G/100ML
INJECTION, SOLUTION INTRAVENOUS CONTINUOUS
Status: DISCONTINUED | OUTPATIENT
Start: 2017-02-06 | End: 2017-02-06

## 2017-02-06 RX ORDER — DOCUSATE SODIUM 100 MG/1
100 CAPSULE, LIQUID FILLED ORAL 2 TIMES DAILY PRN
Status: DISCONTINUED | OUTPATIENT
Start: 2017-02-06 | End: 2017-02-07 | Stop reason: HOSPADM

## 2017-02-06 RX ORDER — ONDANSETRON 2 MG/ML
4 INJECTION INTRAMUSCULAR; INTRAVENOUS EVERY 6 HOURS PRN
Status: DISCONTINUED | OUTPATIENT
Start: 2017-02-06 | End: 2017-02-07 | Stop reason: HOSPADM

## 2017-02-06 RX ADMIN — SODIUM CHLORIDE, POTASSIUM CHLORIDE, SODIUM LACTATE AND CALCIUM CHLORIDE: 600; 310; 30; 20 INJECTION, SOLUTION INTRAVENOUS at 10:35

## 2017-02-06 RX ADMIN — OXYCODONE HYDROCHLORIDE AND ACETAMINOPHEN 2 TABLET: 5; 325 TABLET ORAL at 20:22

## 2017-02-06 RX ADMIN — IBUPROFEN 600 MG: 600 TABLET, FILM COATED ORAL at 19:10

## 2017-02-06 RX ADMIN — DOCUSATE SODIUM 100 MG: 100 CAPSULE ORAL at 19:10

## 2017-02-06 RX ADMIN — ONDANSETRON 4 MG: 2 INJECTION, SOLUTION INTRAMUSCULAR; INTRAVENOUS at 11:53

## 2017-02-06 RX ADMIN — FENTANYL CITRATE 100 MCG: 50 INJECTION, SOLUTION INTRAMUSCULAR; INTRAVENOUS at 07:32

## 2017-02-06 RX ADMIN — Medication 125 ML/HR: at 16:02

## 2017-02-06 RX ADMIN — ROPIVACAINE HYDROCHLORIDE 200 MG: 2 INJECTION, SOLUTION EPIDURAL; INFILTRATION at 15:19

## 2017-02-06 RX ADMIN — SODIUM CHLORIDE, SODIUM LACTATE, POTASSIUM CHLORIDE, CALCIUM CHLORIDE AND DEXTROSE MONOHYDRATE: 5; 600; 310; 30; 20 INJECTION, SOLUTION INTRAVENOUS at 08:20

## 2017-02-06 RX ADMIN — SODIUM CHLORIDE, POTASSIUM CHLORIDE, SODIUM LACTATE AND CALCIUM CHLORIDE: 600; 310; 30; 20 INJECTION, SOLUTION INTRAVENOUS at 08:45

## 2017-02-06 RX ADMIN — FENTANYL CITRATE 100 MCG: 50 INJECTION, SOLUTION INTRAMUSCULAR; INTRAVENOUS at 08:49

## 2017-02-06 RX ADMIN — SODIUM CHLORIDE, POTASSIUM CHLORIDE, SODIUM LACTATE AND CALCIUM CHLORIDE: 600; 310; 30; 20 INJECTION, SOLUTION INTRAVENOUS at 09:25

## 2017-02-06 RX ADMIN — FENTANYL CITRATE 100 MCG: 50 INJECTION, SOLUTION INTRAMUSCULAR; INTRAVENOUS at 06:22

## 2017-02-06 RX ADMIN — SODIUM CHLORIDE, POTASSIUM CHLORIDE, SODIUM LACTATE AND CALCIUM CHLORIDE: 600; 310; 30; 20 INJECTION, SOLUTION INTRAVENOUS at 01:35

## 2017-02-06 RX ADMIN — Medication 2000 ML/HR: at 15:15

## 2017-02-06 ASSESSMENT — PATIENT HEALTH QUESTIONNAIRE - PHQ9
1. LITTLE INTEREST OR PLEASURE IN DOING THINGS: NOT AT ALL
2. FEELING DOWN, DEPRESSED, IRRITABLE, OR HOPELESS: NOT AT ALL
SUM OF ALL RESPONSES TO PHQ QUESTIONS 1-9: 0
SUM OF ALL RESPONSES TO PHQ9 QUESTIONS 1 AND 2: 0

## 2017-02-06 ASSESSMENT — PAIN SCALES - GENERAL
PAINLEVEL_OUTOF10: 4
PAINLEVEL_OUTOF10: 2
PAINLEVEL_OUTOF10: 2
PAINLEVEL_OUTOF10: 8

## 2017-02-06 NOTE — PROGRESS NOTES
2000: 42 y/o  EDC  EGA 39 week presents to labor and delivery unit for scheduled elective induction of labor. Pt reports positive fetal movement, denies vaginal bleeding and LOF. EFM and TOCO applied. VSS.   2010: Dr. Ruffin at bedside. Updated on plan of care. SVE /-2.  0100: Bedside report given to FELICIANO Schmitt RN. Updated on plan of care, no further questions at this time.

## 2017-02-06 NOTE — PROGRESS NOTES
; EDC 17; 39.1    0645 - Report received from FELICIANO Schmitt RN. Pt resting in bed with eyes closed. Pitocin running per active MAR order. FOB at bedside. POC discussed with patient, questions answered.   0720 - Pt c/o increased pressure and pain. SVE unchanged at 5-6/80/-1. Pt requesting additional dose of fentanyl for pain control. Educated pt on availability of medication, RN will administer when available. Pt agrees.   0820 - Dr. Martha Munoz at bedside.   0840 - Dr. Brenner at bedside to assess patient and educate regarding epidural. Bolus started.   1005 - Dr. Brenner at bedside to placed epidural.   1010 - Test dose given, pt tolerated well.   1034 - Dr. Martha Munoz at bedside. SVE 7/90/-1. AROM, clear fluid.   1330 - pt status discussed with Dr. Martha Munoz. SVE complete/100/0. Pt laboring down.   1415 - SVE C/+1. Pt placed in stirrups, pushing with RN coaching.   1440 - Dr. Martha Munoz called to attend delivery.   1451 -  of viable female infant. APGARs 8/9  1515 - Fundus firm at U with light lochia, 1 clot measuring approx 4x4 cm.   1530 - Fundus firm at U with light lochia. Pt with no complaints at this time. FOB and pt's mother at bedside. Call light in reach.   1750 - Epidural removed. Pt up to bathroom with steady gait. + void.    - Pt transferred to [facility] via wheelchair with infant in arms, belongings at side. Report to JAMARCUS Talbert.

## 2017-02-06 NOTE — PROGRESS NOTES
0100: Received report from FELICIANO Davis RN. POC discussed. Pt is coping with contractions at this time. No further questions at this time.  0400: SVE 3-4/70/-2 done per FELICIANO Schmitt RN.  0610: Pt c/o more pressure and pain, SVE 5-6/80/-1 done per FELICIANO Schmitt RN.  0700: Report given to VIVIEN Silva RN. POC discussed not further questions at this time.

## 2017-02-06 NOTE — H&P
History and Physical      Jose Steinberg is a 41 y.o. female  at 39w0d who presents for elective IOL. Pt earlier in the pregnancy had a abnormal Quad screen but with further testing with PANN was tested negative. Pt had some US showing SGA baby but both patient and FOB are short in stature and states their other babies at term and beyond are 5 to 7 lbs and this baby per Dr. Overton is 6+ lbs. Pt does not feel this baby is smaller than her other babies so this is essentially an elective IOL that FOB and Dr. Overton would like to have performed tonight per patient.    Subjective:   negative  For CTXS.   negative Feels pain   negative for LOF  negative for vaginal bleeding.   positive for fetal movement    ROS: Pertinent items are noted in HPI.    Past Medical History   Diagnosis Date   • Renal calculi    • Heart valve disorder      Past Surgical History   Procedure Laterality Date   • Gyn surgery       freq yeast infections   • Other       kidney stones removed      OB History    Para Term  AB SAB TAB Ectopic Multiple Living   6 3 3  2 2    3      # Outcome Date GA Lbr Dallin/2nd Weight Sex Delivery Anes PTL Lv   6 Current            5 Term 96    M Vag-Spont  N Y   4 Term 03/15/95 39w0d   M Vag-Spont   Y   3 Term 94 39w0d   M Vag-Spont   Y   2 SAB            1 SAB                 Social History     Social History   • Marital Status: Single     Spouse Name: N/A   • Number of Children: N/A   • Years of Education: N/A     Occupational History   • Not on file.     Social History Main Topics   • Smoking status: Former Smoker   • Smokeless tobacco: Not on file      Comment: 1/2 pack week   • Alcohol Use: No   • Drug Use: No   • Sexual Activity: Not on file     Other Topics Concern   • Not on file     Social History Narrative     Allergies: Food    Current facility-administered medications:   •  fentaNYL (SUBLIMAZE) injection 50 mcg, 50 mcg, Intravenous, Q HOUR OVIDION, Maria Ruffin,  "M.D.  •  fentaNYL (SUBLIMAZE) injection 100 mcg, 100 mcg, Intravenous, Q HOUR PRN, Maria Ruffin M.D.  •  terbutaline (BRETHINE) injection 0.25 mg, 0.25 mg, Intravenous, PRN, Maria Ruffin M.D.  •  hydrOXYzine (ATARAX) tablet 50 mg, 50 mg, Oral, Q6HRS PRN, Maria Ruffin M.D.  •  mag hydrox-al hydrox-simeth (MAALOX PLUS ES or MYLANTA DS) suspension 30 mL, 30 mL, Oral, Q6HRS PRN, Maria Ruffin M.D.  •  citric acid-sodium citrate (BICITRA) 334-500 MG/5ML solution 30 mL, 30 mL, Oral, Q6HRS PRN, Maria Ruffin M.D.  •  oxytocin (PITOCIN) infusion (for induction), 0.5-20 anabelle-units/min, Intravenous, Continuous, Maria Ruffin M.D.  •  methylergonovine (METHERGINE) injection 0.2 mg, 0.2 mg, Intramuscular, Once PRN, Maria Ruffin M.D.  •  misoprostol (CYTOTEC) tablet 800 mcg, 800 mcg, Rectal, Once PRN, Maria Ruffin M.D.    Prenatal care with [facility] starting at 18 weeks and saw Dr. Overton at 34, 37 and 38 weeks with following problems:  Patient Active Problem List    Diagnosis Date Noted   • Indication for care in labor or delivery 2017   • Noncompliant pregnant patient in third trimester 2017   • SGA (small for gestational age) 2017   • Carpal tunnel syndrome during pregnancy 2016   • Supervision of other normal pregnancy, antepartum 2016   • AMA (advanced maternal age) multigravida 35+ 2016   • Abnormal  AFP screen 2016               Objective:      Temperature 36.3 °C (97.3 °F), temperature source Tympanic, height 1.499 m (4' 11\"), weight 80.74 kg (178 lb), last menstrual period 2016, currently breastfeeding.    General:   no acute distress, alert, cooperative   Skin:   normal   HEENT:  EOMI and Neck supple with midline trachea   Lungs:   CTA bilateral   Heart:   regular rate and rhythm   Abdomen:   gravid, NT   EFW:  6 lbs 8 oz   Pelvis:  adequate with gynecoid pelvis   FHT:  120s BPM   Uterine Size: S=D   Presentations: Cephalic   Cervix:     Dilation: 1-2 cm    Effacement: 50%    " Station:  -2    Consistency: Soft    Position: Middle     Lab Review  Lab:   Blood type: O     Recent Results (from the past 5880 hour(s))   POCT US - In Clinic OB Scan    Collection Time: 08/16/16 10:38 AM   Result Value Ref Range    In Clinic OB Scan     THINPREP PAP W/HPV AND CTNG    Collection Time: 09/13/16 10:04 AM   Result Value Ref Range    Cytology Reg See Path Report     Source Cervical     Source Genital     HPV Genotype 16 Negative Negative    HPV Genotype 18 Negative Negative    HPV Other High Risk Genotypes Negative Negative    C. trachomatis by PCR Negative Negative    N. gonorrhoeae by PCR Negative Negative   PRENATAL PANEL 3+HIV+UACXI    Collection Time: 09/14/16  2:14 PM   Result Value Ref Range    Rubella IgG Antibody 82.10 IU/mL    Syphilis, Treponemal Qual Non Reactive Non Reactive    Hepatitis B Surface Antigen Negative Negative    Color Yellow     Character Clear     Specific Gravity 1.024 <1.035    Ph 6.0 5.0-8.0    Glucose Negative Negative mg/dL    Ketones Negative Negative mg/dL    Protein Negative Negative mg/dL    Bilirubin Negative Negative    Nitrite Negative Negative    Leukocyte Esterase Negative Negative    Occult Blood Negative Negative    Micro Urine Req see below     Culture Indicated No UA Culture    WBC 12.8 (H) 4.8 - 10.8 K/uL    RBC 4.29 4.20 - 5.40 M/uL    Hemoglobin 13.8 12.0 - 16.0 g/dL    Hematocrit 40.2 37.0 - 47.0 %    MCV 93.7 81.4 - 97.8 fL    MCH 32.2 27.0 - 33.0 pg    MCHC 34.3 33.6 - 35.0 g/dL    RDW 45.7 35.9 - 50.0 fL    Platelet Count 262 164 - 446 K/uL    MPV 9.7 9.0 - 12.9 fL    Neutrophils-Polys 73.30 (H) 44.00 - 72.00 %    Lymphocytes 18.70 (L) 22.00 - 41.00 %    Monocytes 5.60 0.00 - 13.40 %    Eosinophils 1.30 0.00 - 6.90 %    Basophils 0.30 0.00 - 1.80 %    Immature Granulocytes 0.80 0.00 - 0.90 %    Nucleated RBC 0.00 /100 WBC    Neutrophils (Absolute) 9.35 (H) 2.00 - 7.15 K/uL    Lymphs (Absolute) 2.38 1.00 - 4.80 K/uL    Monos (Absolute) 0.71 0.00 -  0.85 K/uL    Eos (Absolute) 0.17 0.00 - 0.51 K/uL    Baso (Absolute) 0.04 0.00 - 0.12 K/uL    Immature Granulocytes (abs) 0.10 0.00 - 0.11 K/uL    NRBC (Absolute) 0.00 K/uL   AFP TETRA    Collection Time: 09/14/16  2:14 PM   Result Value Ref Range    AFP Value -Eia 50 ng/mL    AFP MOM Value 1.11     Hcg Value 95199 IU/L    Hcg Mom 2.21     Ue3 Value 1.45 ng/mL    Ue3 Mom 0.90     Interpretation Abnormal (A)     Maternal [age] at SIGRID 40.9 yr    Gestational Age Based On US     Gestational Age 18.43 weeks    Insulin Dependent Diabetes No     Race White     Multiple Pregnancy One     Haily Value -Eia 643 pg/mL    Haily Mom Value 4.04     Maternal Weight 151 lbs    Err Maternal Scrn AFP See Note    HIV ANTIBODIES    Collection Time: 09/14/16  2:14 PM   Result Value Ref Range    HIV Ag/Ab Combo Assay Non Reactive Non Reactive   OP PRENATAL PANEL-BLOOD BANK    Collection Time: 09/14/16  2:14 PM   Result Value Ref Range    ABO Grouping Only O     Rh Grouping Only POS     Antibody Screen Scrn NEG    GLUCOSE 1HR GESTATIONAL    Collection Time: 11/08/16  9:18 AM   Result Value Ref Range    Glucose, Post Dose 73 70 - 139 mg/dL   CBC WITHOUT DIFFERENTIAL    Collection Time: 11/08/16  9:18 AM   Result Value Ref Range    WBC 11.3 (H) 4.8 - 10.8 K/uL    RBC 4.14 (L) 4.20 - 5.40 M/uL    Hemoglobin 13.7 12.0 - 16.0 g/dL    Hematocrit 39.1 37.0 - 47.0 %    MCV 94.4 81.4 - 97.8 fL    MCH 33.1 (H) 27.0 - 33.0 pg    MCHC 35.0 33.6 - 35.0 g/dL    RDW 45.1 35.9 - 50.0 fL    Platelet Count 234 164 - 446 K/uL    MPV 9.5 9.0 - 12.9 fL   T.PALLIDUM AB EIA    Collection Time: 11/08/16  9:18 AM   Result Value Ref Range    Syphilis, Treponemal Qual Non Reactive Non Reactive   GRP B STREP, BY PCR (PALMA BROTH)    Collection Time: 01/20/17  3:02 PM   Result Value Ref Range    Strep Gp B DNA PCR Negative Negative   POCT Fetal Nonstress Test    Collection Time: 01/25/17 12:00 PM   Result Value Ref Range    NST Indications SGA     NST Baseline 130s     NST  Uterine Activity none     NST Acoustic Stimulation vas     NST Assessment Cat 1     NST Action Necessary      NST Other Data      NST Return twice weekly     NST Read By Emmalena    POCT Fetal Nonstress Test    Collection Time: 17  9:57 AM   Result Value Ref Range    NST Indications SGA     NST Baseline 140     NST Uterine Activity none     NST Acoustic Stimulation no     NST Assessment reactive     NST Action Necessary none     NST Other Data      NST Return      NST Read By     POCT Fetal Nonstress Test    Collection Time: 17  2:32 PM   Result Value Ref Range    NST Indications SGA     NST Baseline 120s     NST Uterine Activity none     NST Acoustic Stimulation      NST Assessment Cat 1     NST Action Necessary      NST Other Data      NST Return      NST Read By Emmalena         Assessment:   Jose Steinberg at 39w0d  Labor status: Not in labor.  Obstetrical history significant for   Patient Active Problem List    Diagnosis Date Noted   • Indication for care in labor or delivery 2017   • Noncompliant pregnant patient in third trimester 2017   • SGA (small for gestational age) 2017   • Carpal tunnel syndrome during pregnancy 2016   • Supervision of other normal pregnancy, antepartum 2016   • AMA (advanced maternal age) multigravida 35+ 2016   • Abnormal  AFP screen 2016   .      Plan:     Admit to L&D  GBS negative  Favorable Dawn Score  Will start pitocin IOL

## 2017-02-06 NOTE — DELIVERY NOTE
DELIVERY NOTE:2017    Admitting Diagnosis:  41 year old  39 weeks IOL, SGA  GBS negative   Pitocin  induction/augmentation.   She progressed uneventfully in labor and delivered via  over intact perineum at 1451 under epidural anesthesia  to a LBF in OA presentation with BW 2900 (6 lb 6 oz) AS .  Good suctioning of the nose and mouth was done, cord clamped and cut and baby  handed off to the RN in attendance of further care.   Clear AF.  Placenta was delivered spontaneously at 1455 complete and intact with 3 vessel cord.   Estimated Blood loss- 250 cc  Uterus noted to be firm and contracted immediately postpartum.  No other cervical nor vaginal lacerations noted.  Patient tolerated the procedure well. No complications encountered.  Both patient and baby are in good condition.

## 2017-02-06 NOTE — CARE PLAN
Problem: Knowledge Deficit  Goal: Knowledge of disease process/condition, treatment plan, diagnostic tests, and medications will improve  Outcome: PROGRESSING AS EXPECTED  POC discussed with patient and FOB, including desire for epidural for pain management. Questions answered.

## 2017-02-07 VITALS
HEART RATE: 82 BPM | DIASTOLIC BLOOD PRESSURE: 55 MMHG | HEIGHT: 59 IN | RESPIRATION RATE: 18 BRPM | TEMPERATURE: 97.9 F | WEIGHT: 178 LBS | SYSTOLIC BLOOD PRESSURE: 109 MMHG | OXYGEN SATURATION: 95 % | BODY MASS INDEX: 35.88 KG/M2

## 2017-02-07 PROBLEM — Z91.199 NONCOMPLIANT PREGNANT PATIENT IN THIRD TRIMESTER: Status: RESOLVED | Noted: 2017-02-05 | Resolved: 2017-02-07

## 2017-02-07 PROBLEM — O09.893 NONCOMPLIANT PREGNANT PATIENT IN THIRD TRIMESTER: Status: RESOLVED | Noted: 2017-02-05 | Resolved: 2017-02-07

## 2017-02-07 LAB
ERYTHROCYTE [DISTWIDTH] IN BLOOD BY AUTOMATED COUNT: 49 FL (ref 35.9–50)
HCT VFR BLD AUTO: 39.2 % (ref 37–47)
HGB BLD-MCNC: 12.8 G/DL (ref 12–16)
MCH RBC QN AUTO: 31.6 PG (ref 27–33)
MCHC RBC AUTO-ENTMCNC: 32.7 G/DL (ref 33.6–35)
MCV RBC AUTO: 96.8 FL (ref 81.4–97.8)
PLATELET # BLD AUTO: 282 K/UL (ref 164–446)
PMV BLD AUTO: 10.2 FL (ref 9–12.9)
RBC # BLD AUTO: 4.05 M/UL (ref 4.2–5.4)
WBC # BLD AUTO: 16.5 K/UL (ref 4.8–10.8)

## 2017-02-07 PROCEDURE — 700102 HCHG RX REV CODE 250 W/ 637 OVERRIDE(OP): Performed by: OBSTETRICS & GYNECOLOGY

## 2017-02-07 PROCEDURE — 700112 HCHG RX REV CODE 229: Performed by: OBSTETRICS & GYNECOLOGY

## 2017-02-07 PROCEDURE — A9270 NON-COVERED ITEM OR SERVICE: HCPCS | Performed by: OBSTETRICS & GYNECOLOGY

## 2017-02-07 PROCEDURE — 85027 COMPLETE CBC AUTOMATED: CPT

## 2017-02-07 PROCEDURE — 36415 COLL VENOUS BLD VENIPUNCTURE: CPT

## 2017-02-07 RX ORDER — PSEUDOEPHEDRINE HCL 30 MG
100 TABLET ORAL 2 TIMES DAILY PRN
Qty: 60 CAP | Refills: 0 | Status: SHIPPED | OUTPATIENT
Start: 2017-02-07 | End: 2019-01-14

## 2017-02-07 RX ORDER — OXYCODONE HYDROCHLORIDE AND ACETAMINOPHEN 5; 325 MG/1; MG/1
1 TABLET ORAL EVERY 4 HOURS PRN
Qty: 20 TAB | Refills: 0 | Status: SHIPPED | OUTPATIENT
Start: 2017-02-07 | End: 2019-01-14

## 2017-02-07 RX ORDER — IBUPROFEN 600 MG/1
600 TABLET ORAL EVERY 6 HOURS PRN
Qty: 30 TAB | Refills: 0 | Status: SHIPPED | OUTPATIENT
Start: 2017-02-07 | End: 2019-01-14

## 2017-02-07 RX ADMIN — IBUPROFEN 600 MG: 600 TABLET, FILM COATED ORAL at 06:10

## 2017-02-07 RX ADMIN — IBUPROFEN 600 MG: 600 TABLET, FILM COATED ORAL at 12:46

## 2017-02-07 RX ADMIN — OXYCODONE HYDROCHLORIDE AND ACETAMINOPHEN 2 TABLET: 5; 325 TABLET ORAL at 00:21

## 2017-02-07 RX ADMIN — Medication 1 TABLET: at 10:24

## 2017-02-07 RX ADMIN — OXYCODONE HYDROCHLORIDE AND ACETAMINOPHEN 1 TABLET: 5; 325 TABLET ORAL at 06:10

## 2017-02-07 RX ADMIN — OXYCODONE HYDROCHLORIDE AND ACETAMINOPHEN 2 TABLET: 5; 325 TABLET ORAL at 14:36

## 2017-02-07 RX ADMIN — DOCUSATE SODIUM 100 MG: 100 CAPSULE ORAL at 10:25

## 2017-02-07 RX ADMIN — OXYCODONE HYDROCHLORIDE AND ACETAMINOPHEN 2 TABLET: 5; 325 TABLET ORAL at 10:29

## 2017-02-07 ASSESSMENT — PAIN SCALES - GENERAL
PAINLEVEL_OUTOF10: 3
PAINLEVEL_OUTOF10: 6
PAINLEVEL_OUTOF10: 3
PAINLEVEL_OUTOF10: 8
PAINLEVEL_OUTOF10: 2

## 2017-02-07 ASSESSMENT — PATIENT HEALTH QUESTIONNAIRE - PHQ9
1. LITTLE INTEREST OR PLEASURE IN DOING THINGS: NOT AT ALL
SUM OF ALL RESPONSES TO PHQ QUESTIONS 1-9: 0
SUM OF ALL RESPONSES TO PHQ9 QUESTIONS 1 AND 2: 0
2. FEELING DOWN, DEPRESSED, IRRITABLE, OR HOPELESS: NOT AT ALL

## 2017-02-07 NOTE — DISCHARGE SUMMARY
Discharge Summary:      Jose Steinberg      Admit Date:   2017  Discharge Date:  2017     Admitting diagnosis:  Pregnancy  IOL  AMA (advanced maternal age) multigravida 35+  Indication for care in labor or delivery  Noncompliant pregnant patient in third trimester  AMA (advanced maternal age) multigravida 35+  Noncompliant pregnant patient in third trimester  Discharge Diagnosis: Status post vaginal, spontaneous.  Pregnancy Complications: Advanced maternal age, limited prenatal care, small for gestational age   Tubal Ligation:  no        History:  Past Medical History   Diagnosis Date   • Renal calculi    • Heart valve disorder    • Yeast infection      OB History    Para Term  AB SAB TAB Ectopic Multiple Living   6 3 3  2 2    3      # Outcome Date GA Lbr Dallin/2nd Weight Sex Delivery Anes PTL Lv   6 Current            5 Term 96    M Vag-Spont  N Y   4 Term 03/15/95 39w0d   M Vag-Spont   Y   3 Term 94 39w0d   M Vag-Spont   Y   2 SAB            1 SAB                    Food  Patient Active Problem List    Diagnosis Date Noted   • SGA (small for gestational age) 2017   • Carpal tunnel syndrome during pregnancy 2016   • Supervision of other normal pregnancy, antepartum 2016   • AMA (advanced maternal age) multigravida 35+ 2016   • Abnormal  AFP screen 2016        Hospital Course:   41 y.o. , now para 4, was admitted with the above mentioned diagnosis, underwent Induction of Labor for small for gestational age, vaginal, spontaneous. Patient postpartum course was unremarkable, with progressive advancement in diet , ambulation and toleration of oral analgesia. Patient without complaints today and desires discharge.      Filed Vitals:    17 1700 17 1810 17 0000   BP: 120/66 109/62 124/71 105/55   Pulse: 93 106 95 97   Temp:  37.1 °C (98.8 °F) 36.7 °C (98.1 °F) 36.3 °C (97.4 °F)   TempSrc:       Resp:  20  20 19   Height:       Weight:       SpO2:  96% 96% 94%       Current Facility-Administered Medications   Medication Dose   • oxytocin (PITOCIN) infusion (for postpartum)   mL/hr   • misoprostol (CYTOTEC) tablet 800 mcg  800 mcg   • ibuprofen (MOTRIN) tablet 600 mg  600 mg   • oxycodone-acetaminophen (PERCOCET) 5-325 MG per tablet 1 Tab  1 Tab   • oxycodone-acetaminophen (PERCOCET) 5-325 MG per tablet 2 Tab  2 Tab   • ondansetron (ZOFRAN) syringe/vial injection 4 mg  4 mg   • docusate sodium (COLACE) capsule 100 mg  100 mg   • magnesium hydroxide (MILK OF MAGNESIA) suspension 30 mL  30 mL   • prenatal plus vitamin (STUARTNATAL 1+1) 27-1 MG tablet 1 Tab  1 Tab       Exam:  Breast Exam: negative  Abdomen: Abdomen soft, non-tender. BS normal. No masses,  No organomegaly  Fundus Non Tender: yes, above umbilicus   Incision: none  Perineum: perineum intact  Extremity: no edema, redness or tenderness in the calves or thighs, Homans sign is negative, no sign of DVT, feet normal, good pulses, normal color, temperature and sensation     Labs:  Recent Labs      02/05/17   2110   WBC  13.2*   RBC  4.31   HEMOGLOBIN  14.1   HEMATOCRIT  39.7   MCV  92.1   MCH  32.7   MCHC  35.5*   RDW  46.3   PLATELETCT  286   MPV  9.3        Activity:   Discharge to home  Pelvic Rest x 6 weeks    Assessment:  normal postpartum course  Discharge Assessment: Taking adequate diet and fluids, No heavy bleeding or foul vaginal discharge , No breast redness or severe pain of breast. Voiding spontaneously.      Follow up: .[facility] or [facility] in 5 weeks for vaginal.   To resume daily PNV and iron supplement if needed with hydration.   Patient to RT [facility] or ER if any of the following occur:  Fever over 100.5  Severe abdominal pain  Red streaks or painful masses in the breasts  Foul smelling discharge or lochia  Heavy vaginal bleeding saturating a pad per hour  S/s of PP depression     Discharge Meds:   Current Outpatient Prescriptions    Medication Sig Dispense Refill   • oxycodone-acetaminophen (PERCOCET) 5-325 MG Tab Take 1 Tab by mouth every four hours as needed for Moderate Pain ((Pain Scale 4-6); If acetaminophen ineffective). 20 Tab 0   • ibuprofen (MOTRIN) 600 MG Tab Take 1 Tab by mouth every 6 hours as needed (Cramping). 30 Tab 0   • docusate sodium 100 MG Cap Take 100 mg by mouth 2 times a day as needed for Constipation. 60 Cap 0       Baltazar Sharpe M.D.

## 2017-02-07 NOTE — CARE PLAN
Problem: Potential for postpartum infection related to presence of episiotomy/vaginal tear and/or uterine contamination  Goal: Patient will be absent from signs and symptoms of infection  Outcome: PROGRESSING AS EXPECTED  Patient shows no s/s of infection.  Will continue to monitor with VS and hourly rounding.    Problem: Alteration in comfort related to episiotomy, vaginal repair and/or after birth pains  Goal: Patient verbalizes acceptable pain level  Outcome: PROGRESSING AS EXPECTED  Patient states pain level is WNL.  Will continue to monitor with Q6H VS and hourly rounding.

## 2017-02-07 NOTE — PROGRESS NOTES
Pt doing well bonding with baby, assessment done pt complained of mild to moderate abdominal pain medicated her as per doctor orders, Needs attended.

## 2017-02-07 NOTE — CARE PLAN
Problem: Altered physiologic condition related to immediate post-delivery state and potential for bleeding/hemorrhage  Goal: Patient physiologically stable as evidenced by normal lochia, palpable uterine involution and vital signs within normal limits  Outcome: PROGRESSING AS EXPECTED  Fundal massage done with light bleeding    Problem: Alteration in comfort related to episiotomy, vaginal repair and/or after birth pains  Goal: Patient is able to ambulate, care for self and infant  Outcome: PROGRESSING AS EXPECTED  Pain medicine given a srequested

## 2017-02-07 NOTE — DISCHARGE INSTRUCTIONS
POSTPARTUM DISCHARGE INSTRUCTIONS FOR MOM    YOB: 1975   Age: 41 y.o.               Admit Date: 2017     Discharge Date: 2017  Attending Doctor:  Annika Overton M.D.                  Allergies:  Food    Discharged to home by car. Discharged via wheelchair, hospital escort: Yes.  Special equipment needed: Not Applicable  Belongings with: Personal  Be sure to schedule a follow-up appointment with your primary care doctor or any specialists as instructed.     Discharge Plan:   Diet Plan: Discussed  Activity Level: Discussed  Smoking Cessation Offered: Patient Refused  Confirmed Follow up Appointment: Patient to Call and Schedule Appointment  Confirmed Symptoms Management: Discussed  Medication Reconciliation Updated: Yes  Influenza Vaccine Indication: Patient Refuses    REASONS TO CALL YOUR OBSTETRICIAN:  1.   Persistent fever or shaking chills (Temperature higher than 100.4)  2.   Heavy bleeding (soaking more than 1 pad per hour); Passing clots  3.   Foul odor from vagina  4.   Mastitis (Breast infection; breast pain, chills, fever, redness)  5.   Urinary pain, burning or frequency  6.   Episiotomy infection  7.   Abdominal incision infection  8.   Severe depression longer than 24 hours    HAND WASHING  · Prior to handling the baby.  · Before breastfeeding or bottle feeding baby.  · After using the bathroom or changing the baby's diaper.    WOUND CARE  Ask your physician for additional care instructions.  In general:    ·  Incision:      · Keep clean and dry.    · Do NOT lift anything heavier than your baby for up to 6 weeks.    · There should not be any opening or pus.      VAGINAL CARE  · Nothing inside vagina for 6 weeks: no sexual intercourse, tampons or douching.  · Bleeding may continue for 2-4 weeks.  Amount may vary.    · Call your physician for heavy bleeding which means soaking more than 1 pad per hour    BIRTH CONTROL  · It is possible to become pregnant at any time after delivery  "and while breastfeeding.  · Plan to discuss a method of birth control with your physician at your follow up visit. visit.    DIET AND ELIMINATION  · Eating more fiber (bran cereal, fruits, and vegetables) and drinking plenty of fluids will help to avoid constipation.  · Urinary frequency after childbirth is normal.    POSTPARTUM BLUES  During the first few days after birth, you may experience a sense of the \"blues\" which may include impatience, irritability or even crying.  These feeling come and go quickly.  However, as many as 1 in 10 women experience emotional symptoms known as postpartum depression.    Postpartum depression:  May start as early as the second or third day after delivery or take several weeks or months to develop.  Symptoms of \"blues\" are present, but are more intense:  Crying spells; loss of appetite; feelings of hopelessness or loss of control; fear of touching the baby; over concern or no concern at all about the baby; little or no concern about your own appearance/caring for yourself; and/or inability to sleep or excessive sleeping.  Contact your physician if you are experiencing any of these symptoms.    Crisis Hotline:  · [place] Crisis Hotline:  7-116-JWPKYWU  Or 1-452.543.5349  · [place] Crisis Hotline:  1-889.267.1429  Or 744-987-2522    PREVENTING SHAKEN BABY:  If you are angry or stressed, PUT THE BABY IN THE CRIB, step away, take some deep breaths, and wait until you are calm to care for the baby.  DO NOT SHAKE THE BABY.  You are not alone, call a supporter for help.    · Crisis Call Center 24/7 crisis line 404-335-1936 or 1-656.677.8231  · You can also text them, text \"ANSWER\" to 469748    QUIT SMOKING/TOBACCO USE:  I understand the use of any tobacco products increases my chance of suffering from future heart disease and could cause other illnesses which may shorten my life. Quitting the use of tobacco products is the single most important thing I can do to improve my health. For " further information on smoking / tobacco cessation call a Toll Free Quit Line at 1-220.272.9049 (*National [employer] [place]) or 1-923.149.1232 (American Lung Association) or you can access the web based program at www.lungusa.org.    · [place] Tobacco Users Help Line:  (828) 196-9799       Toll Free: 1-423.626.7000  · Quit Tobacco Program [facility] (907)627-2033    DEPRESSION / SUICIDE RISK:  As you are discharged from this [facility], it is important to learn how to keep safe from harming yourself.    Recognize the warning signs:  · Abrupt changes in personality, positive or negative- including increase in energy   · Giving away possessions  · Change in eating patterns- significant weight changes-  positive or negative  · Change in sleeping patterns- unable to sleep or sleeping all the time   · Unwillingness or inability to communicate  · Depression  · Unusual sadness, discouragement and loneliness  · Talk of wanting to die  · Neglect of personal appearance   · Rebelliousness- reckless behavior  · Withdrawal from people/activities they love  · Confusion- inability to concentrate     If you or a loved one observes any of these behaviors or has concerns about self-harm, here's what you can do:  · Talk about it- your feelings and reasons for harming yourself  · Remove any means that you might use to hurt yourself (examples: pills, rope, extension cords, firearm)  · Get professional help from the community (Mental Health, Substance Abuse, psychological counseling)  · Do not be alone:Call your Safe Contact- someone whom you trust who will be there for you.  · Call your local CRISIS HOTLINE 797-0831 or 105-978-1130  · Call your local Children's Mobile Crisis Response Team [place] (623) 577-0354 or www.RegeneRx  · Call the toll free National Suicide Prevention Hotlines   · National Suicide Prevention Lifeline 659-594-QCSY (5121)  · National Hope Line Network 800-SUICIDE  (577-7978)    DISCHARGE SURVEY:  Thank you for choosing [facility].  We hope we provided you with very good care.  You may be receiving a survey in the mail.  Please fill it out.  Your opinion is valuable to us.    ADDITIONAL EDUCATIONAL MATERIALS GIVEN TO PATIENT:        My signature on this form indicates that:  1.  I have reviewed and understand the above information  2.  My questions regarding this information have been answered to my satisfaction.  3.  I have formulated a plan with my discharge nurse to obtain my prescribed medication for home.

## 2017-02-08 NOTE — PROGRESS NOTES
Beatriz Aranda R.N. Lactation Consultant Signed  Progress Notes 2/7/2017  3:30 PM      Expand All Collapse All    Lactation note:      Met with mother for feeding. She already had baby latched in cradle hold. Latch appears somewhat shallow. Showed mother how to put baby nose to her nipple in cross cradle hold and stimulate baby to open her mouth widely and attach with a deep latch. Explained latch and how important it is to get baby latched past the nipple so that she gets her milk and Mom's nipple won't get sore. Mom states she could tell the difference in comfort with the deeper latch. After baby detached showed mother how to use the football hold and get baby latched worked out well. Encouraged mother to nurse at least 8 to 12 times in 24 hours and to watch baby's output. Parents have [facility] and will check on their lactation benefits. BF handbook given to them and we discussed other outpatient resources for them. Donna RN, IBCLC

## 2017-02-08 NOTE — PROGRESS NOTES
Discharge teaching reviewed with patient, all questions answered. Pt given all written information on self and infant care, including prescriptions and follow-up instructions. Pt doing well with infant, and feels comfortable with her feeding plan.  Discharged to home at 1745. Escorted out in wheelchair by staff.

## 2017-03-14 ENCOUNTER — POST PARTUM (OUTPATIENT)
Dept: OBGYN | Facility: CLINIC | Age: 42
End: 2017-03-14
Payer: COMMERCIAL

## 2017-03-14 VITALS
WEIGHT: 162.4 LBS | HEIGHT: 59 IN | BODY MASS INDEX: 32.74 KG/M2 | SYSTOLIC BLOOD PRESSURE: 112 MMHG | DIASTOLIC BLOOD PRESSURE: 74 MMHG

## 2017-03-14 DIAGNOSIS — Z30.8 ENCOUNTER FOR OTHER CONTRACEPTIVE MANAGEMENT: ICD-10-CM

## 2017-03-14 DIAGNOSIS — Z12.39 SCREENING FOR BREAST CANCER: ICD-10-CM

## 2017-03-14 PROCEDURE — 90050 PR POSTPARTUM VISIT: CPT | Performed by: OBSTETRICS & GYNECOLOGY

## 2017-03-14 NOTE — Clinical Note
March 14, 2017       Patient: Jose Steinberg   YOB: 1975   Date of Visit: 3/14/2017         To Whom It May Concern:    It is my medical opinion that Jose Steinberg return to work on 04/27/2017 due to complications of pregnancy..    If you have any questions or concerns, please don't hesitate to call [phone]          Sincerely,          Annika Overton M.D.  Electronically Signed

## 2017-03-14 NOTE — MR AVS SNAPSHOT
"        Jose Friendedywayne   3/14/2017 3:30 PM   Post Partum   MRN: [id number]    Department:  [facility]   Dept Phone:  [phone]    Description:  Female : 1975   Provider:  Annika Overton M.D.           Allergies as of 3/14/2017     Allergen Noted Reactions    Food 2016   Rash    [place] milk       You were diagnosed with     Screening for breast cancer   [[id number]]       Encounter for other contraceptive management   [[id number]]         Vital Signs     Blood Pressure Height Weight Body Mass Index Last Menstrual Period Breastfeeding?    112/74 mmHg 1.499 m (4' 11\") 73.664 kg (162 lb 6.4 oz) 32.78 kg/m2 2016 Unknown    Smoking Status                   Former Smoker           Basic Information     Date Of Birth Sex Race Ethnicity Preferred Language    1975 Female White Non- English      Problem List              ICD-10-CM Priority Class Noted - Resolved    Supervision of other normal pregnancy, antepartum Z34.80   2016 - Present    AMA (advanced maternal age) multigravida 35+ O09.529   2016 - Present    Abnormal  AFP screen O28.0   2016 - Present    Carpal tunnel syndrome during pregnancy O26.899, G56.00   2016 - Present    SGA (small for gestational age) P05.00   2017 - Present      Health Maintenance        Date Due Completion Dates    MAMMOGRAM 3/8/2016 ---    IMM INFLUENZA (1) 2016 ---    PAP SMEAR 2019, 2016, 2016    IMM DTaP/Tdap/Td Vaccine (2 - Td) 11/15/2026 11/15/2016            Current Immunizations     Tdap Vaccine 11/15/2016      Below and/or attached are the medications your provider expects you to take. Review all of your home medications and newly ordered medications with your provider and/or pharmacist. Follow medication instructions as directed by your provider and/or pharmacist. Please keep your medication list with you and share with your provider. Update the information when medications are " discontinued, doses are changed, or new medications (including over-the-counter products) are added; and carry medication information at all times in the event of emergency situations     Allergies:  FOOD - Rash               Medications  Valid as of: March 14, 2017 -  4:09 PM    Generic Name Brand Name Tablet Size Instructions for use    Docusate Sodium (Cap)  MG Take 100 mg by mouth 2 times a day as needed for Constipation.        Ibuprofen (Tab) MOTRIN 600 MG Take 1 Tab by mouth every 6 hours as needed (Cramping).        Oxycodone-Acetaminophen (Tab) PERCOCET 5-325 MG Take 1 Tab by mouth every four hours as needed for Moderate Pain ((Pain Scale 4-6); If acetaminophen ineffective).        Prenatal Vit-Fe Fumarate-FA   Take  by mouth.        .                 Medicines prescribed today were sent to:     [facility] [address] [place] [address] - [address]    [address] [facility] [address]    Phone: [phone] Fax: [phone]    Open 24 Hours?: No      Medication refill instructions:       If your prescription bottle indicates you have medication refills left, it is not necessary to call your provider’s office. Please contact your pharmacy and they will refill your medication.    If your prescription bottle indicates you do not have any refills left, you may request refills at any time through one of the following ways: The online [employer] system (except Urgent Care), by calling your provider’s office, or by asking your pharmacy to contact your provider’s office with a refill request. Medication refills are processed only during regular business hours and may not be available until the next business day. Your provider may request additional information or to have a follow-up visit with you prior to refilling your medication.   *Please Note: Medication refills are assigned a new Rx number when refilled electronically. Your pharmacy may indicate that no refills were authorized even though a new prescription for  the same medication is available at the pharmacy. Please request the medicine by name with the pharmacy before contacting your provider for a refill.        Your To Do List     Future Labs/Procedures Complete By Expires    MA-SCREEN MAMMO W/CAD-BILAT  As directed 3/14/2018      Referral     A referral request has been sent to our patient care coordination department. Please allow 3-5 business days for us to process this request and contact you either by phone or mail. If you do not hear from us by the 5th business day, please call us at [phone].        Other Notes About Your Plan     Baby girl.           [employer] Access Code: Activation code not generated  Current [employer] Status: Active

## 2017-03-14 NOTE — PROGRESS NOTES
"Jose Steinberg Is a 42 y.o.  status post  on 17. Patient is here today for a post partum check.  Currently the patient is without complaints.  She reports Normal  BM.  Normal  appetite without nausea and vomiting. She denies fever. Pain is under good control.  No VB.  Not breastfeeding.    /74 mmHg  Ht 1.499 m (4' 11\")  Wt 73.664 kg (162 lb 6.4 oz)  BMI 32.78 kg/m2  LMP 2016  Breastfeeding? Unknown  GENERAL: Alert, in no apparent distress  Appropriate affect, intact insight and judgement.  ABDOMEN: Soft, nontender, nondistended.  No palpable masses.  No rebound or guarding.  No inguinal lymphadenopathy.  BACK: No CVA tenderness    BREAST: No masses or tenderness.  No skin changes.  No nipple inversion or discharge. No axillary lymphadenopathy.      GENITOURINARY:  Normal external genitalia, no lesions.  Normal urethral meatus, no masses or tenderness.  Normal bladder without fullness or masses.  Vagina well estrogenized, no vaginal discharge or lesions.  Cervix without lesions or discharge, nontender.  Uterus normal size, shape, and contour, nontender.  Adnexa nontender, no masses.  Normal anus and perineum.      Assessment and plan  Status post   Desires BTL - referral for surgical scheduling placed.  Mammogram ordered.       "

## 2017-03-28 ENCOUNTER — TELEPHONE (OUTPATIENT)
Dept: OBGYN | Facility: CLINIC | Age: 42
End: 2017-03-28

## 2017-05-17 ENCOUNTER — HOSPITAL ENCOUNTER (OUTPATIENT)
Facility: MEDICAL CENTER | Age: 42
End: 2017-05-17
Attending: OBSTETRICS & GYNECOLOGY | Admitting: OBSTETRICS & GYNECOLOGY
Payer: COMMERCIAL

## 2017-05-17 ENCOUNTER — GYNECOLOGY VISIT (OUTPATIENT)
Dept: OBGYN | Facility: CLINIC | Age: 42
End: 2017-05-17
Payer: COMMERCIAL

## 2017-05-17 DIAGNOSIS — Z30.2 ENCOUNTER FOR STERILIZATION: ICD-10-CM

## 2017-05-17 PROCEDURE — 99214 OFFICE O/P EST MOD 30 MIN: CPT | Mod: 57 | Performed by: OBSTETRICS & GYNECOLOGY

## 2017-05-17 RX ORDER — IBUPROFEN 800 MG/1
800 TABLET ORAL EVERY 8 HOURS PRN
Qty: 30 TAB | Refills: 0 | Status: SHIPPED | OUTPATIENT
Start: 2017-05-17

## 2017-05-17 RX ORDER — OXYCODONE HYDROCHLORIDE AND ACETAMINOPHEN 5; 325 MG/1; MG/1
1-2 TABLET ORAL EVERY 4 HOURS PRN
Qty: 15 TAB | Refills: 0 | Status: SHIPPED | OUTPATIENT
Start: 2017-05-17 | End: 2019-01-14

## 2017-05-17 NOTE — Clinical Note
May 17, 2017       Patient: Jose Steinberg   YOB: 1975   Date of Visit: 5/17/2017         To Whom It May Concern:    It is my medical opinion that Jose Steinberg return to work on 5/29/17 with no restrictions.    If you have any questions or concerns, please don't hesitate to call [phone]          Sincerely,          Annika Overton M.D.  Electronically Signed

## 2017-05-17 NOTE — MR AVS SNAPSHOT
Jose Milligan Maryan   2017 9:45 AM   Gynecology Visit   MRN: [id number]    Department:  [facility]   Dept Phone:  [phone]    Description:  Female : 1975   Provider:  Annika Overton M.D.           Reason for Visit     Pre-op Exam           Allergies as of 2017     Allergen Noted Reactions    Food 2016   Rash    [place] milk       You were diagnosed with     Encounter for sterilization   [[id number]]         Vital Signs     Smoking Status                   Former Smoker           Basic Information     Date Of Birth Sex Race Ethnicity Preferred Language    1975 Female White Non- English      Your appointments     2017  1:00 PM   Pre / Post GYN Surgical Visit with Annika Overton M.D.   [facility] [address], [address]  [facility] [address]   [phone]              Problem List              ICD-10-CM Priority Class Noted - Resolved    Supervision of other normal pregnancy, antepartum Z34.80   2016 - Present    AMA (advanced maternal age) multigravida 35+ O09.529   2016 - Present    Abnormal  AFP screen O28.0   2016 - Present    Carpal tunnel syndrome during pregnancy O26.899, G56.00   2016 - Present    SGA (small for gestational age) P05.00   2017 - Present      Health Maintenance        Date Due Completion Dates    MAMMOGRAM 3/8/2016 ---    PAP SMEAR 2019, 2016, 2016    IMM DTaP/Tdap/Td Vaccine (2 - Td) 11/15/2026 11/15/2016            Current Immunizations     Tdap Vaccine 11/15/2016      Below and/or attached are the medications your provider expects you to take. Review all of your home medications and newly ordered medications with your provider and/or pharmacist. Follow medication instructions as directed by your provider and/or pharmacist. Please keep your medication list with you and share with your provider. Update the information when  medications are discontinued, doses are changed, or new medications (including over-the-counter products) are added; and carry medication information at all times in the event of emergency situations     Allergies:  FOOD - Rash               Medications  Valid as of: May 17, 2017 - 10:19 AM    Generic Name Brand Name Tablet Size Instructions for use    Docusate Sodium (Cap)  MG Take 100 mg by mouth 2 times a day as needed for Constipation.        Ibuprofen (Tab) MOTRIN 600 MG Take 1 Tab by mouth every 6 hours as needed (Cramping).        Ibuprofen (Tab) MOTRIN 800 MG Take 1 Tab by mouth every 8 hours as needed.        Oxycodone-Acetaminophen (Tab) PERCOCET 5-325 MG Take 1 Tab by mouth every four hours as needed for Moderate Pain ((Pain Scale 4-6); If acetaminophen ineffective).        Oxycodone-Acetaminophen (Tab) PERCOCET 5-325 MG Take 1-2 Tabs by mouth every four hours as needed.        Prenatal Vit-Fe Fumarate-FA   Take  by mouth.        .                 Medicines prescribed today were sent to:     [facility] [address] [place], [place] [address] [address] [place], [place] [id number] [address] [place] [id number]    Phone: [phone] Fax: [phone]    Open 24 Hours?: No      Medication refill instructions:       If your prescription bottle indicates you have medication refills left, it is not necessary to call your provider’s office. Please contact your pharmacy and they will refill your medication.    If your prescription bottle indicates you do not have any refills left, you may request refills at any time through one of the following ways: The online [employer] system (except Urgent Care), by calling your provider’s office, or by asking your pharmacy to contact your provider’s office with a refill request. Medication refills are processed only during regular business hours and may not be available until the next business day. Your provider may request additional information or to have a follow-up visit with you prior to refilling your  medication.   *Please Note: Medication refills are assigned a new Rx number when refilled electronically. Your pharmacy may indicate that no refills were authorized even though a new prescription for the same medication is available at the pharmacy. Please request the medicine by name with the pharmacy before contacting your provider for a refill.        Other Notes About Your Plan     Baby girl.           iFollot Access Code: Activation code not generated  Current eGifter Status: Active

## 2017-05-18 NOTE — H&P
DATE OF SURGERY:  2017    CHIEF COMPLAINT:  Desires permanent sterilization.    HISTORY OF PRESENT ILLNESS:  This 42-year-old  6, para 4-0-2-4,   currently breastfeeding.  Last menstrual period of 2016, presents for   preoperative exam desiring permanent sterilization.  The patient has completed   her childbearing and under no circumstances desires more children.    PAST MEDICAL HISTORY:  1.  Remarkable for history of renal stones.  2.  History of arrhythmia, status post cardiac ablation 8 years ago.  Patient   states she has no further symptoms after the ablation.    PAST SURGICAL HISTORY:  In , cardiac ablation.    MEDICATIONS:  None.    ALLERGIES:  No known drug allergies.    SOCIAL HISTORY:  No alcohol use.  No drug use.  Minimal tobacco use, 3-5   cigarettes per day, is trying to quit.    OBSTETRIC HISTORY:  Spontaneous vaginal delivery x4.    FAMILY HISTORY:  Unremarkable.    REVIEW OF SYSTEMS:  Negative in all systems.  Patient denies fevers, chills,   upper respiratory symptoms, nausea, vomiting, changes in bowel or bladder   habits.    PHYSICAL EXAMINATION:  VITAL SIGNS:  She is 4 feet 11 inches tall, weighs 162 pounds, and blood   pressure 112/82.  GENERAL:  Alert and in no apparent distress.  HEENT:  Unremarkable.  NECK:  Supple without adenopathy.  No thyromegaly.  RESPIRATORY:  Lungs are clear to auscultation bilaterally.  CARDIOVASCULAR:  Regular rate and rhythm without murmur, gallop, or rub.  ABDOMEN:  Soft, nontender, and nondistended.  No palpable masses.  No rebound   or guarding.  BACK:  No CVA tenderness.  EXTREMITIES:  No clubbing, cyanosis, or edema.  No calf tenderness.  PELVIC:  Normal external female genitalia without lesions.  Vagina is rugated   without evidence of discharge.  Normal bladder.  Cervix is multiparous.  No   lesions, nontender.  Cervix normal size, shape, contour, anteverted, mobile,   and nontender.  Adnexa, no adnexal masses.  Perineum and anus  normal.    ASSESSMENT AND PLAN:  This is a 42-year-old  6, para 4-0-2-4 who   desires permanent sterilization.  She was counseled on equally effective and   alternative forms of birth control to include oral contraceptives,   Depo-Provera, NuvaRing, Ortho-Evra, Nexplanon, Mirena intrauterine device and   ParaGard intrauterine device, vasectomy, and barrier methods condoms.  She   desires permanent sterilization.  The risks, benefits, alternatives, and   indications of permanent sterilization were discussed in detail with the   patient to include bleeding, infection, blood transfusion, damage to   surrounding organs including bowel or bladder, ureters, nerves, vessels, need   for repair, need for future surgery, and possible laparotomy.  Rates of   failure of 1 in 200 if failure of tubal ligation, 50% chance that the   pregnancy may be in the fallopian tube and be a surgical emergency.  The   patient understands that this is a permanent procedure as well.  Also,   discussed unexpected complications, unexpected pathology, and anesthesia   risks.  The patient is aware of the complications and desires to proceed.    Plan is for a laparoscopic bilateral tubal ligation on 2017 at 12 p.m.    The patient was given a script for her postoperative medications, Motrin 800   mg p.o. q. 8 hours p.r.n. #30, no refills and Percocet 5/325 one p.o. q. 4   hours p.r.n. #15, no refills.       ____________________________________     Annika Overton MD [facility] / [place]    DD:  2017 11:17:37  DT:  2017 11:40:36    D#:  [id number]  Job#:  [id number]

## 2017-05-19 NOTE — H&P
DATE OF SURGERY:  2017    CHIEF COMPLAINT:  Desires permanent sterilization.    HISTORY OF PRESENT ILLNESS:  This 42-year-old  6, para 4-0-2-4,   currently breastfeeding.  Last menstrual period of 2016, presents for   preoperative exam desiring permanent sterilization.  The patient has completed   her childbearing and under no circumstances desires more children.    PAST MEDICAL HISTORY:  1.  Remarkable for history of renal stones.  2.  History of arrhythmia, status post cardiac ablation 8 years ago.  Patient   states she has no further symptoms after the ablation.    PAST SURGICAL HISTORY:  In , cardiac ablation.    MEDICATIONS:  None.    ALLERGIES:  No known drug allergies.    SOCIAL HISTORY:  No alcohol use.  No drug use.  Minimal tobacco use, 3-5   cigarettes per day, is trying to quit.    OBSTETRIC HISTORY:  Spontaneous vaginal delivery x4.    FAMILY HISTORY:  Unremarkable.    REVIEW OF SYSTEMS:  Negative in all systems.  Patient denies fevers, chills,   upper respiratory symptoms, nausea, vomiting, changes in bowel or bladder   habits.    PHYSICAL EXAMINATION:  VITAL SIGNS:  She is 4 feet 11 inches tall, weighs 162 pounds, and blood   pressure 112/82.  GENERAL:  Alert and in no apparent distress.  HEENT:  Unremarkable.  NECK:  Supple without adenopathy.  No thyromegaly.  RESPIRATORY:  Lungs are clear to auscultation bilaterally.  CARDIOVASCULAR:  Regular rate and rhythm without murmur, gallop, or rub.  ABDOMEN:  Soft, nontender, and nondistended.  No palpable masses.  No rebound   or guarding.  BACK:  No CVA tenderness.  EXTREMITIES:  No clubbing, cyanosis, or edema.  No calf tenderness.  PELVIC:  Normal external female genitalia without lesions.  Vagina is rugated   without evidence of discharge.  Normal bladder.  Cervix is multiparous.  No   lesions, nontender.  Cervix normal size, shape, contour, anteverted, mobile,   and nontender.  Adnexa, no adnexal masses.  Perineum and anus  normal.    ASSESSMENT AND PLAN:  This is a 42-year-old  6, para 4-0-2-4 who   desires permanent sterilization.  She was counseled on equally effective and   alternative forms of birth control to include oral contraceptives,   Depo-Provera, NuvaRing, Ortho-Evra, Nexplanon, Mirena intrauterine device and   ParaGard intrauterine device, vasectomy, and barrier methods condoms.  She   desires permanent sterilization.  The risks, benefits, alternatives, and   indications of permanent sterilization were discussed in detail with the   patient to include bleeding, infection, blood transfusion, damage to   surrounding organs including bowel or bladder, ureters, nerves, vessels, need   for repair, need for future surgery, and possible laparotomy.  Rates of   failure of 1 in 200 if failure of tubal ligation, 50% chance that the   pregnancy may be in the fallopian tube and be a surgical emergency.  The   patient understands that this is a permanent procedure as well.  Also,   discussed unexpected complications, unexpected pathology, and anesthesia   risks.  The patient is aware of the complications and desires to proceed.    Plan is for a laparoscopic bilateral tubal ligation on 2017 at 12 p.m.    The patient was given a script for her postoperative medications, Motrin 800   mg p.o. q. 8 hours p.r.n. #30, no refills and Percocet 5/325 one p.o. q. 4   hours p.r.n. #15, no refills.       ____________________________________     Annika Overton MD [facility] / [place]    DD:  2017 11:17:37  DT:  2017 11:40:36    D#:  [id number]  Job#:  [id number]

## 2017-10-12 ENCOUNTER — GYNECOLOGY VISIT (OUTPATIENT)
Dept: OBGYN | Facility: CLINIC | Age: 42
End: 2017-10-12
Payer: COMMERCIAL

## 2017-10-12 VITALS
WEIGHT: 163 LBS | BODY MASS INDEX: 32.86 KG/M2 | DIASTOLIC BLOOD PRESSURE: 82 MMHG | SYSTOLIC BLOOD PRESSURE: 135 MMHG | HEIGHT: 59 IN

## 2017-10-12 DIAGNOSIS — Z30.09 GENERAL COUNSELING AND ADVICE ON CONTRACEPTIVE MANAGEMENT: ICD-10-CM

## 2017-10-12 DIAGNOSIS — Z12.39 BREAST CANCER SCREENING: ICD-10-CM

## 2017-10-12 PROCEDURE — 99213 OFFICE O/P EST LOW 20 MIN: CPT | Performed by: OBSTETRICS & GYNECOLOGY

## 2017-10-12 NOTE — PROGRESS NOTES
"S: Jose presents for contraceptive counseling.  She was set up for a laparoscopic BT, however, could not afford the payment.  She would like to avoid hormones.  Menses are regular, not heavy.  No vaginal discharge or pelvic pain.    O:Blood pressure 135/82, height 1.499 m (4' 11\"), weight 73.9 kg (163 lb), not currently breastfeeding.    Exam deferred.  Pap current 9/16 wnl    A/P:  1. Discussed all forms of contraception to include oral contraceptive pills (combined and progesterone only), Depo Provera, Ortho Evra, Nuva Ring, Nexplanon, Mirena, Danna, and Paraguard IUDs, Permanent sterilization in the form of Essure or laparoscopic bilateral tubal ligation, vasectomy, and condom use.  Reviewed failure rates of less than 1% and common side effects.  She does not want any hormones, but is afraid of IUD because friend had a bad experience with it.  Brochure given on Mirena and Paraguard.    Patient would like to try condoms and spermicide for now.     2. Screening mammogram ordered.    F/U prn.    Greater than 50%  of this 15 minute visit was used for face to face counseling and coordination of care for this patient and her medical conditions  "

## 2017-11-28 ENCOUNTER — OFFICE VISIT (OUTPATIENT)
Dept: URGENT CARE | Facility: CLINIC | Age: 42
End: 2017-11-28
Payer: COMMERCIAL

## 2017-11-28 VITALS
DIASTOLIC BLOOD PRESSURE: 86 MMHG | SYSTOLIC BLOOD PRESSURE: 146 MMHG | RESPIRATION RATE: 16 BRPM | TEMPERATURE: 98.6 F | BODY MASS INDEX: 32.86 KG/M2 | WEIGHT: 163 LBS | HEART RATE: 110 BPM | OXYGEN SATURATION: 97 % | HEIGHT: 59 IN

## 2017-11-28 DIAGNOSIS — K04.7 PERIAPICAL ABSCESS: ICD-10-CM

## 2017-11-28 PROCEDURE — 99214 OFFICE O/P EST MOD 30 MIN: CPT | Performed by: EMERGENCY MEDICINE

## 2017-11-28 RX ORDER — CLINDAMYCIN HYDROCHLORIDE 300 MG/1
300 CAPSULE ORAL 4 TIMES DAILY
Qty: 28 CAP | Refills: 0 | Status: SHIPPED | OUTPATIENT
Start: 2017-11-28 | End: 2017-12-05

## 2017-11-28 ASSESSMENT — ENCOUNTER SYMPTOMS
NAUSEA: 0
NUMBNESS: 0
EYE PAIN: 0
SORE THROAT: 0
SENSORY CHANGE: 0
FOCAL WEAKNESS: 0
VOMITING: 0
FEVER: 1
HEADACHES: 1

## 2017-11-28 NOTE — PATIENT INSTRUCTIONS
Avoid smoking!  Cessation is highly recommended, but at least attempt to reduce frequency of smoking until the illness resolves. Use over-the-counter pain reliever, such as acetaminophen (Tylenol), ibuprofen (Advil, Motrin) or naproxen (Aleve) as needed; follow package directions for dosing. Use an oral probiotic daily, such as Culturelle, Align, or yogurt to reduce gastrointestinal symptoms. Referral provided.    Dental Abscess  A dental abscess is pus in or around a tooth.  HOME CARE  · Take medicines only as told by your dentist.  · If you were prescribed antibiotic medicine, finish all of it even if you start to feel better.  · Rinse your mouth (gargle) often with salt water.  · Do not drive or use heavy machinery, like a , while taking pain medicine.  · Do not apply heat to the outside of your mouth.  · Keep all follow-up visits as told by your dentist. This is important.  GET HELP IF:  · Your pain is worse, and medicine does not help.  GET HELP RIGHT AWAY IF:  · You have a fever or chills.  · Your symptoms suddenly get worse.  · You have a very bad headache.  · You have problems breathing or swallowing.  · You have trouble opening your mouth.  · You have puffiness (swelling) in your neck or around your eye.     This information is not intended to replace advice given to you by your health care provider. Make sure you discuss any questions you have with your health care provider.     Document Released: 05/03/2016 Document Reviewed: 12/15/2015  "[employer] Interactive Patient Education ©2016 "[employer] Inc.

## 2017-11-28 NOTE — PROGRESS NOTES
Subjective:      Jose Steinberg is a 42 y.o. female who presents with Oral Pain (right side swelling and tooth pain X last night )            Oral Pain   This is a new problem. The current episode started yesterday. The problem has been gradually worsening. Associated symptoms include a fever and headaches. Pertinent negatives include no congestion, nausea, numbness, rash, sore throat or vomiting. Exacerbated by: biting. She has tried NSAIDs for the symptoms. The treatment provided mild relief.   History of left upper molar loose.  Notes prior intolerance of PCN, Augmentin.    Review of Systems   Constitutional: Positive for fever.   HENT: Negative for congestion and sore throat.    Eyes: Negative for pain.   Gastrointestinal: Negative for nausea and vomiting.   Skin: Negative for rash.   Neurological: Positive for headaches. Negative for sensory change, focal weakness and numbness.     PMH:  has a past medical history of Heart valve disorder; Renal calculi; and Yeast infection. She also has no past medical history of ASTHMA; CAD (coronary artery disease); Cancer (CMS-HCC); Congestive heart failure (CMS-HCC); COPD; Diabetes; Hypertension; Liver disease; Psychiatric disorder; Seizure disorder (CMS-HCC); or Stroke (CMS-HCC).  MEDS:   Current Outpatient Prescriptions:   •  clindamycin (CLEOCIN) 300 MG Cap, Take 1 Cap by mouth 4 times a day for 7 days., Disp: 28 Cap, Rfl: 0  •  ibuprofen (MOTRIN) 600 MG Tab, Take 1 Tab by mouth every 6 hours as needed (Cramping)., Disp: 30 Tab, Rfl: 0  •  oxycodone-acetaminophen (PERCOCET) 5-325 MG Tab, Take 1-2 Tabs by mouth every four hours as needed., Disp: 15 Tab, Rfl: 0  •  ibuprofen (MOTRIN) 800 MG Tab, Take 1 Tab by mouth every 8 hours as needed., Disp: 30 Tab, Rfl: 0  •  oxycodone-acetaminophen (PERCOCET) 5-325 MG Tab, Take 1 Tab by mouth every four hours as needed for Moderate Pain ((Pain Scale 4-6); If acetaminophen ineffective). (Patient not taking: Reported on  "3/14/2017), Disp: 20 Tab, Rfl: 0  •  docusate sodium 100 MG Cap, Take 100 mg by mouth 2 times a day as needed for Constipation. (Patient not taking: Reported on 3/14/2017), Disp: 60 Cap, Rfl: 0  •  Prenatal Vit-Fe Fumarate-FA (PRENAPLUS PO), Take  by mouth., Disp: , Rfl:   ALLERGIES:   Allergies   Allergen Reactions   • Food Rash     [place] milk      SURGHX:   Past Surgical History:   Procedure Laterality Date   • OTHER      kidney stones removed 2003   • OTHER CARDIAC SURGERY       SOCHX:  reports that she has been smoking.  She has been smoking about 0.25 packs per day. She has never used smokeless tobacco. She reports that she does not drink alcohol or use drugs.  FH: family history is not on file.       Objective:     /86   Pulse (!) 110   Temp 37 °C (98.6 °F)   Resp 16   Ht 1.499 m (4' 11\")   Wt 73.9 kg (163 lb)   SpO2 97%   BMI 32.92 kg/m²      Physical Exam   Constitutional: She appears well-developed and well-nourished. She is cooperative.  Non-toxic appearance. She does not appear ill.   HENT:   Head: Normocephalic and atraumatic.   Left Ear: External ear normal.   Nose: No rhinorrhea.   Mouth/Throat: Uvula is midline and mucous membranes are normal. Abnormal dentition. Dental caries present. No uvula swelling. No oropharyngeal exudate, posterior oropharyngeal edema or posterior oropharyngeal erythema.   No temporomandibular joint tenderness or crepitus. Left upper second molar loose, tender.  No pointing at gum.   Eyes: Conjunctivae are normal.   Neck: Phonation normal. Neck supple.   Pulmonary/Chest: Effort normal.   Neurological: She is alert.   No facial palsy.   Skin: Skin is warm, dry and intact. No rash noted.   Psychiatric: She has a normal mood and affect.               Assessment/Plan:     1. Periapical abscess  Advised oral hygiene measures, avoid smoking.  OTC NSAID prn  - clindamycin (CLEOCIN) 300 MG Cap; Take 1 Cap by mouth 4 times a day for 7 days.  Dispense: 28 Cap; Refill: 0  - " REFERRAL TO DENTISTRY

## 2018-11-01 ENCOUNTER — OFFICE VISIT (OUTPATIENT)
Dept: URGENT CARE | Facility: CLINIC | Age: 43
End: 2018-11-01
Payer: COMMERCIAL

## 2018-11-01 VITALS
WEIGHT: 159 LBS | DIASTOLIC BLOOD PRESSURE: 70 MMHG | HEART RATE: 86 BPM | SYSTOLIC BLOOD PRESSURE: 130 MMHG | BODY MASS INDEX: 31.22 KG/M2 | RESPIRATION RATE: 16 BRPM | HEIGHT: 60 IN | TEMPERATURE: 98.3 F | OXYGEN SATURATION: 97 %

## 2018-11-01 DIAGNOSIS — K02.9 DENTAL CARIES: ICD-10-CM

## 2018-11-01 DIAGNOSIS — K08.89 TOOTH PAIN: ICD-10-CM

## 2018-11-01 PROCEDURE — 99214 OFFICE O/P EST MOD 30 MIN: CPT | Performed by: INTERNAL MEDICINE

## 2018-11-01 RX ORDER — NAPROXEN 500 MG/1
500 TABLET ORAL 2 TIMES DAILY WITH MEALS
Qty: 60 TAB | Refills: 1 | Status: SHIPPED | OUTPATIENT
Start: 2018-11-01 | End: 2019-01-14

## 2018-11-01 RX ORDER — AMOXICILLIN 500 MG/1
500 CAPSULE ORAL 3 TIMES DAILY
Qty: 30 CAP | Refills: 0 | Status: SHIPPED | OUTPATIENT
Start: 2018-11-01 | End: 2019-01-14

## 2018-11-01 NOTE — PROGRESS NOTES
Jose Steinberg is a 43 y.o. female who presents for Tooth Ache (xtoday, left top tooth pain)       Patient is a 43-year-old female who presents today with tooth pain.  This is a new problem.  Patient states that it has been worse over the last 2 days.  Patient states that she had a root canal but never completed the work.  She states that it is tender and swollen to touch.  Patient denies any fever shakes chills.  Patient states that she is going to attempt to call a dentist today.        PMH:  has a past medical history of Heart valve disorder; Renal calculi; and Yeast infection. She also has no past medical history of ASTHMA; CAD (coronary artery disease); Cancer (HCC); Congestive heart failure (HCC); COPD; Diabetes; Hypertension; Liver disease; Psychiatric disorder; Seizure disorder (HCC); or Stroke ([facility]).  MEDS:   Current Outpatient Prescriptions:   •  amoxicillin (AMOXIL) 500 MG Cap, Take 1 Cap by mouth 3 times a day., Disp: 30 Cap, Rfl: 0  •  naproxen (NAPROSYN) 500 MG Tab, Take 1 Tab by mouth 2 times a day, with meals., Disp: 60 Tab, Rfl: 1  •  ibuprofen (MOTRIN) 800 MG Tab, Take 1 Tab by mouth every 8 hours as needed., Disp: 30 Tab, Rfl: 0  •  oxycodone-acetaminophen (PERCOCET) 5-325 MG Tab, Take 1-2 Tabs by mouth every four hours as needed. (Patient not taking: Reported on 11/1/2018), Disp: 15 Tab, Rfl: 0  •  oxycodone-acetaminophen (PERCOCET) 5-325 MG Tab, Take 1 Tab by mouth every four hours as needed for Moderate Pain ((Pain Scale 4-6); If acetaminophen ineffective). (Patient not taking: Reported on 3/14/2017), Disp: 20 Tab, Rfl: 0  •  ibuprofen (MOTRIN) 600 MG Tab, Take 1 Tab by mouth every 6 hours as needed (Cramping). (Patient not taking: Reported on 11/1/2018), Disp: 30 Tab, Rfl: 0  •  docusate sodium 100 MG Cap, Take 100 mg by mouth 2 times a day as needed for Constipation. (Patient not taking: Reported on 3/14/2017), Disp: 60 Cap, Rfl: 0  •  Prenatal Vit-Fe Fumarate-FA (PRENAPLUS PO),  Take  by mouth., Disp: , Rfl:   ALLERGIES:   Allergies   Allergen Reactions   • Food Rash     [place] milk      SURGHX:   Past Surgical History:   Procedure Laterality Date   • OTHER      kidney stones removed 2003   • OTHER CARDIAC SURGERY       SOCHX:  reports that she has been smoking.  She has been smoking about 0.25 packs per day. She has never used smokeless tobacco. She reports that she does not drink alcohol or use drugs.  FH: Family history was reviewed, no pertinent findings to report    Review of Systems   All other systems reviewed and are negative.    Allergies   Allergen Reactions   • Food Rash     [place] milk       Objective:   /70 (BP Location: Left arm, Patient Position: Sitting, BP Cuff Size: Adult)   Pulse 86   Temp 36.8 °C (98.3 °F) (Temporal)   Resp 16   Ht 1.524 m (5')   Wt 72.1 kg (159 lb)   SpO2 97%   BMI 31.05 kg/m²   Physical Exam   Constitutional: She is oriented to person, place, and time. She appears well-developed and well-nourished. She appears distressed.   HENT:   Mouth/Throat: Oropharynx is clear and moist and mucous membranes are normal. Abnormal dentition. Dental abscesses and dental caries present.       Eyes: Pupils are equal, round, and reactive to light. Conjunctivae are normal.   Pulmonary/Chest: Effort normal. No respiratory distress.   Neurological: She is alert and oriented to person, place, and time.   Skin: Skin is warm and dry.   Psychiatric: She has a normal mood and affect. Her behavior is normal. Judgment and thought content normal.   Nursing note and vitals reviewed.        Assessment/Plan:   Assessment    1. Dental caries  Patient find a dentist ASAP patient can also use topical Orajel.  2. Tooth pain  - amoxicillin (AMOXIL) 500 MG Cap; Take 1 Cap by mouth 3 times a day.  Dispense: 30 Cap; Refill: 0  - naproxen (NAPROSYN) 500 MG Tab; Take 1 Tab by mouth 2 times a day, with meals.  Dispense: 60 Tab; Refill: 1  Differential diagnosis, natural history,  supportive care, and indications for immediate follow-up discussed.

## 2019-01-09 NOTE — PROGRESS NOTES
Pt arrived via wheelchair with belongings to room S310. Report received from Melissa Silva RN. Pt oriented to room, call light & infant security. Assessment done. Fundus firm, lochia light. Vital signs stable. IV infusing 125 of pitocin. Pt states pain is tolerable, see MAR. Pt aware of dangers related to sleeping with infant, reviewed plan of care with pt & encouraged to call with needs or prior to ambulating. Call light in place. Will continue to monitor.    no

## 2019-01-14 ENCOUNTER — HOSPITAL ENCOUNTER (EMERGENCY)
Facility: MEDICAL CENTER | Age: 44
End: 2019-01-14
Attending: EMERGENCY MEDICINE
Payer: COMMERCIAL

## 2019-01-14 VITALS
HEIGHT: 60 IN | OXYGEN SATURATION: 99 % | SYSTOLIC BLOOD PRESSURE: 132 MMHG | DIASTOLIC BLOOD PRESSURE: 81 MMHG | HEART RATE: 76 BPM | BODY MASS INDEX: 31.08 KG/M2 | RESPIRATION RATE: 16 BRPM | WEIGHT: 158.29 LBS | TEMPERATURE: 97.6 F

## 2019-01-14 DIAGNOSIS — K08.89 PAIN, DENTAL: ICD-10-CM

## 2019-01-14 PROCEDURE — 99283 EMERGENCY DEPT VISIT LOW MDM: CPT

## 2019-01-14 RX ORDER — AMOXICILLIN 500 MG/1
500 TABLET, FILM COATED ORAL 3 TIMES DAILY
Qty: 30 TAB | Refills: 0 | Status: SHIPPED | OUTPATIENT
Start: 2019-01-14 | End: 2019-01-24

## 2019-01-14 RX ORDER — HYDROCODONE BITARTRATE AND ACETAMINOPHEN 5; 325 MG/1; MG/1
1-2 TABLET ORAL EVERY 6 HOURS PRN
Qty: 12 TAB | Refills: 0 | Status: SHIPPED | OUTPATIENT
Start: 2019-01-14 | End: 2019-01-19

## 2019-01-14 ASSESSMENT — PAIN SCALES - GENERAL: PAINLEVEL_OUTOF10: 9

## 2019-01-14 NOTE — ED TRIAGE NOTES
Jose Steinberg  43 y.o.  female  Chief Complaint   Patient presents with   • Dental Pain     Present to triage c/o dental pain ( left upper )  since yesterday. Been taking ibuprofen 800 mg every 2 hrs since last night.

## 2019-01-14 NOTE — ED NOTES
Pt provided with discharge instructions, prescriptions, work excuse, and education. Controlled substance use informed consent reviewed and signed. Pt verbalizes understanding. Denies any questions or concerns at this time. Ambulates independently to lobby.

## 2019-01-14 NOTE — ED PROVIDER NOTES
ED Provider Note      CHIEF COMPLAINT  Chief Complaint   Patient presents with   • Dental Pain       HPI  Jose Steinberg is a 43 y.o. female who presents with dental pain.  The pain is at a site of previous root canal on the left.  She has a dentist however goes him $400 and therefore is not been able to schedule follow-up.  She states she is worried is becoming infected and requesting antibiotics.  She is also requesting pain medication.  She denies history of medication addiction.  She has been taking ibuprofen greater than the recommended amount for pain relief without improvement.  Patient states the pain has been present for weeks however much more severe over the past 2 days.   The pain is severe she states causing her to miss work, gradual onset, and located in the left lower dentition.  The pain is worse with chewing.   The patient denies difficulty breathing or swallowing.        REVIEW OF SYSTEMS    Constitutional: No fever.  Respiratory: No difficulty breathing   Ear nose throat: Dental pain         PAST MEDICAL HISTORY  Past Medical History:   Diagnosis Date   • Heart valve disorder    • Renal calculi    • Yeast infection        FAMILY HISTORY  History reviewed. No pertinent family history.    SOCIAL HISTORY  Social History     Social History   • Marital status: Single     Spouse name: N/A   • Number of children: N/A   • Years of education: N/A     Social History Main Topics   • Smoking status: Former Smoker     Packs/day: 0.25     Quit date: 1/1/2018   • Smokeless tobacco: Never Used      Comment: 1/2 pack week   • Alcohol use No   • Drug use: No   • Sexual activity: Not on file     Other Topics Concern   • Not on file     Social History Narrative   • No narrative on file       SURGICAL HISTORY  Past Surgical History:   Procedure Laterality Date   • OTHER      kidney stones removed 2003   • OTHER CARDIAC SURGERY         CURRENT MEDICATIONS  Home Medications     Reviewed by Yaneth Lopez,  MEL (Registered Nurse) on 01/14/19 at 0631  Med List Status: Complete   Medication Last Dose Status   ibuprofen (MOTRIN) 800 MG Tab  Active                ALLERGIES  Allergies   Allergen Reactions   • Food Rash     [place] milk        PHYSICAL EXAM  VITAL SIGNS: /81   Pulse 76   Temp 36.4 °C (97.6 °F) (Oral)   Resp 16   Ht 1.524 m (5')   Wt 71.8 kg (158 lb 4.6 oz)   SpO2 99%   BMI 30.91 kg/m²    Constitutional:  Non-toxic appearance.  Mild distress secondary to pain  HENT: Left lower tooth has metal post protruding from the tooth with the loss of the apex of the tooth.  Gingiva without acute swelling or evidence of abscess.  Posterior pharynx normal  Eyes: Anicteric, no conjunctivitis.     Neurologic: Speech is clear, follows commands, facial expression is symmetrical.  Psychiatric: Affect normal,  Mood normal.   Musculoskeletal: Neck nontender      COURSE & MEDICAL DECISION MAKING  Pertinent Labs & Imaging studies reviewed. (See chart for details)  Patient has been prescribed hydrocodone for pain control at her request.  [facility] database was reviewed.  She is prescribed amoxicillin for antibody coverage, provided dental referral form.  She is advised to see dentist as soon as possible.    FINAL IMPRESSION  1. Pain, dental               Electronically signed by: Nabeel Claire, 1/14/2019 3:20 PM

## 2020-02-11 NOTE — PROGRESS NOTES
41 y.o.  34w3d The patient is here for routine obstetrical followup. She reports good fetal movement. She denies contractions, vaginal bleeding, or leaking of fluid. C/O hand and feet swelling, works on her feet all day in retail.  No headache, visual changes, RUQ pain.  No hx of pre-e in previous pregnancies.     The patient's pregnancy is complicated by   Patient Active Problem List    Diagnosis Date Noted   • Carpal tunnel syndrome during pregnancy 2016   • Supervision of other normal pregnancy, antepartum 2016   • AMA (advanced maternal age) multigravida 35+ 2016   • Abnormal  AFP screen 2016         Followup in 1 week   labor precautions were discussed with patient  Fetal kick counts were discussed with patient  Declines flu shot and tdap at this time.   
Pt here for OB F/V. Good fetal movement. Denies LOF, VB.    
23.6

## 2020-04-03 ENCOUNTER — HOSPITAL ENCOUNTER (OUTPATIENT)
Facility: MEDICAL CENTER | Age: 45
End: 2020-04-03
Attending: NURSE PRACTITIONER
Payer: COMMERCIAL

## 2020-04-03 ENCOUNTER — OFFICE VISIT (OUTPATIENT)
Dept: URGENT CARE | Facility: CLINIC | Age: 45
End: 2020-04-03
Payer: COMMERCIAL

## 2020-04-03 VITALS
HEART RATE: 94 BPM | SYSTOLIC BLOOD PRESSURE: 120 MMHG | BODY MASS INDEX: 32.66 KG/M2 | RESPIRATION RATE: 16 BRPM | DIASTOLIC BLOOD PRESSURE: 70 MMHG | WEIGHT: 162 LBS | TEMPERATURE: 99.3 F | OXYGEN SATURATION: 97 % | HEIGHT: 59 IN

## 2020-04-03 DIAGNOSIS — J06.9 VIRAL URI WITH COUGH: ICD-10-CM

## 2020-04-03 DIAGNOSIS — R05.9 COUGH: ICD-10-CM

## 2020-04-03 LAB
COVID ORDER STATUS COVID19: NORMAL
FLUAV RNA SPEC QL NAA+PROBE: NEGATIVE
FLUBV RNA SPEC QL NAA+PROBE: NEGATIVE

## 2020-04-03 PROCEDURE — U0002 COVID-19 LAB TEST NON-CDC: HCPCS

## 2020-04-03 PROCEDURE — 87502 INFLUENZA DNA AMP PROBE: CPT

## 2020-04-03 PROCEDURE — 99213 OFFICE O/P EST LOW 20 MIN: CPT | Performed by: NURSE PRACTITIONER

## 2020-04-03 ASSESSMENT — ENCOUNTER SYMPTOMS
HEADACHES: 0
SHORTNESS OF BREATH: 0
COUGH: 1
NAUSEA: 0
RHINORRHEA: 1
DIARRHEA: 0
SPUTUM PRODUCTION: 1
CHILLS: 1
FEVER: 1
MYALGIAS: 0
WHEEZING: 0
SORE THROAT: 0

## 2020-04-03 NOTE — PROGRESS NOTES
Subjective:      Jose Padron is a 45 y.o. female who presents with Cough (Pt reports fevers of 100.1, chills, body aches, post nasal drip. Onset 1 week)            Cough   This is a new problem. The current episode started in the past 7 days. The problem has been unchanged. The cough is productive of sputum. Associated symptoms include chills, a fever, nasal congestion, postnasal drip and rhinorrhea. Pertinent negatives include no headaches, myalgias, sore throat, shortness of breath or wheezing.     Food  Current Outpatient Medications on File Prior to Visit   Medication Sig Dispense Refill   • ibuprofen (MOTRIN) 800 MG Tab Take 1 Tab by mouth every 8 hours as needed. 30 Tab 0     No current facility-administered medications on file prior to visit.      Social History     Socioeconomic History   • Marital status: Single     Spouse name: Not on file   • Number of children: Not on file   • Years of education: Not on file   • Highest education level: Not on file   Occupational History   • Not on file   Social Needs   • Financial resource strain: Not on file   • Food insecurity     Worry: Not on file     Inability: Not on file   • Transportation needs     Medical: Not on file     Non-medical: Not on file   Tobacco Use   • Smoking status: Former Smoker     Packs/day: 0.25     Last attempt to quit: 2018     Years since quittin.2   • Smokeless tobacco: Never Used   • Tobacco comment: 1/2 pack week   Substance and Sexual Activity   • Alcohol use: No   • Drug use: No   • Sexual activity: Not on file   Lifestyle   • Physical activity     Days per week: Not on file     Minutes per session: Not on file   • Stress: Not on file   Relationships   • Social connections     Talks on phone: Not on file     Gets together: Not on file     Attends Congregational service: Not on file     Active member of club or organization: Not on file     Attends meetings of clubs or organizations: Not on file     Relationship status:  "Not on file   • Intimate partner violence     Fear of current or ex partner: Not on file     Emotionally abused: Not on file     Physically abused: Not on file     Forced sexual activity: Not on file   Other Topics Concern   • Not on file   Social History Narrative   • Not on file     Breast Cancer-related family history is not on file.      Review of Systems   Constitutional: Positive for chills and fever.   HENT: Positive for congestion, postnasal drip and rhinorrhea. Negative for sore throat.    Respiratory: Positive for cough and sputum production. Negative for shortness of breath and wheezing.    Gastrointestinal: Negative for diarrhea and nausea.   Musculoskeletal: Negative for myalgias.   Neurological: Negative for headaches.          Objective:     /70 (BP Location: Right arm, Patient Position: Sitting, BP Cuff Size: Adult)   Pulse 94   Temp 37.4 °C (99.3 °F) (Temporal)   Resp 16   Ht 1.499 m (4' 11\")   Wt 73.5 kg (162 lb)   SpO2 97%   BMI 32.72 kg/m²      Physical Exam  Vitals signs and nursing note reviewed.   Constitutional:       General: She is not in acute distress.     Appearance: She is well-developed.   HENT:      Head: Normocephalic and atraumatic.      Right Ear: Ear canal and external ear normal. No middle ear effusion. Tympanic membrane is not injected or perforated.      Left Ear: Ear canal and external ear normal.  No middle ear effusion. Tympanic membrane is not injected or perforated.      Nose: Mucosal edema and congestion present.      Mouth/Throat:      Pharynx: No oropharyngeal exudate or posterior oropharyngeal erythema.   Eyes:      General:         Right eye: No discharge.         Left eye: No discharge.      Conjunctiva/sclera: Conjunctivae normal.   Neck:      Musculoskeletal: Normal range of motion and neck supple.   Cardiovascular:      Rate and Rhythm: Normal rate and regular rhythm.      Heart sounds: Normal heart sounds. No murmur.   Pulmonary:      Effort: " Pulmonary effort is normal. No respiratory distress.      Breath sounds: Normal breath sounds.   Musculoskeletal: Normal range of motion.      Comments: Normal movement of all 4 extremities.   Lymphadenopathy:      Cervical: No cervical adenopathy.      Upper Body:      Right upper body: No supraclavicular adenopathy.      Left upper body: No supraclavicular adenopathy.   Skin:     General: Skin is warm and dry.   Neurological:      Mental Status: She is alert and oriented to person, place, and time.      Gait: Gait normal.   Psychiatric:         Behavior: Behavior normal.         Thought Content: Thought content normal.                 Assessment/Plan:       1. Viral URI with cough     2. Cough  COVID/SARS CoV-2    Influenza A/B By PCR (Adult - Flu Only)    Droplet, Contact, and Eye Protection     Viral illness at this time with no indication for antibiotics. Reviewed with patient expected course of illness and also reviewed OTC medications that may be used for symptom relief. Follow up 7-10 days if not improving.  Will call with results.

## 2020-04-04 LAB
SARS-COV-2 RNA SPEC QL NAA+PROBE: NOT DETECTED
SPECIMEN SOURCE: NORMAL

## 2020-04-07 ENCOUNTER — OFFICE VISIT (OUTPATIENT)
Dept: URGENT CARE | Facility: CLINIC | Age: 45
End: 2020-04-07
Payer: COMMERCIAL

## 2020-04-07 ENCOUNTER — TELEPHONE (OUTPATIENT)
Dept: URGENT CARE | Facility: CLINIC | Age: 45
End: 2020-04-07

## 2020-04-07 VITALS
SYSTOLIC BLOOD PRESSURE: 138 MMHG | HEART RATE: 86 BPM | OXYGEN SATURATION: 98 % | WEIGHT: 162 LBS | BODY MASS INDEX: 32.66 KG/M2 | DIASTOLIC BLOOD PRESSURE: 84 MMHG | HEIGHT: 59 IN | TEMPERATURE: 98.6 F

## 2020-04-07 DIAGNOSIS — B96.89 ACUTE BACTERIAL SINUSITIS: ICD-10-CM

## 2020-04-07 DIAGNOSIS — R05.9 COUGH: ICD-10-CM

## 2020-04-07 DIAGNOSIS — J01.90 ACUTE BACTERIAL SINUSITIS: ICD-10-CM

## 2020-04-07 PROCEDURE — 99214 OFFICE O/P EST MOD 30 MIN: CPT | Performed by: NURSE PRACTITIONER

## 2020-04-07 RX ORDER — BENZONATATE 200 MG/1
200 CAPSULE ORAL 3 TIMES DAILY PRN
Qty: 60 CAP | Refills: 0 | Status: SHIPPED | OUTPATIENT
Start: 2020-04-07

## 2020-04-07 RX ORDER — AMOXICILLIN AND CLAVULANATE POTASSIUM 875; 125 MG/1; MG/1
1 TABLET, FILM COATED ORAL 2 TIMES DAILY
Qty: 14 TAB | Refills: 0 | Status: SHIPPED | OUTPATIENT
Start: 2020-04-07 | End: 2020-04-14

## 2020-04-07 ASSESSMENT — ENCOUNTER SYMPTOMS
SPUTUM PRODUCTION: 1
NAUSEA: 0
CHILLS: 0
HEADACHES: 0
SHORTNESS OF BREATH: 1
FEVER: 0
COUGH: 1
DIARRHEA: 0
HOARSE VOICE: 1
MYALGIAS: 0
SORE THROAT: 0

## 2020-04-07 NOTE — PROGRESS NOTES
Subjective:      Jose Padron is a 45 y.o. female who presents with Cough (SOB  Pt was see on Friday for same symptoms and has not gotten any better) and Fever (100.5 at 2 pm)            Sinus Problem   This is a recurrent problem. The current episode started 1 to 4 weeks ago. The problem is unchanged. There has been no fever. Associated symptoms include congestion, coughing, a hoarse voice and shortness of breath. Pertinent negatives include no chills, headaches or sore throat.   She was seen on Friday, tested for covid and negative results.  Food  Current Outpatient Medications on File Prior to Visit   Medication Sig Dispense Refill   • ibuprofen (MOTRIN) 800 MG Tab Take 1 Tab by mouth every 8 hours as needed. 30 Tab 0     No current facility-administered medications on file prior to visit.      Social History     Socioeconomic History   • Marital status: Single     Spouse name: Not on file   • Number of children: Not on file   • Years of education: Not on file   • Highest education level: Not on file   Occupational History   • Not on file   Social Needs   • Financial resource strain: Not on file   • Food insecurity     Worry: Not on file     Inability: Not on file   • Transportation needs     Medical: Not on file     Non-medical: Not on file   Tobacco Use   • Smoking status: Former Smoker     Packs/day: 0.25     Last attempt to quit: 2018     Years since quittin.2   • Smokeless tobacco: Never Used   • Tobacco comment: 1/2 pack week   Substance and Sexual Activity   • Alcohol use: No   • Drug use: No   • Sexual activity: Not on file   Lifestyle   • Physical activity     Days per week: Not on file     Minutes per session: Not on file   • Stress: Not on file   Relationships   • Social connections     Talks on phone: Not on file     Gets together: Not on file     Attends Taoism service: Not on file     Active member of club or organization: Not on file     Attends meetings of clubs or  "organizations: Not on file     Relationship status: Not on file   • Intimate partner violence     Fear of current or ex partner: Not on file     Emotionally abused: Not on file     Physically abused: Not on file     Forced sexual activity: Not on file   Other Topics Concern   • Not on file   Social History Narrative   • Not on file     Breast Cancer-related family history is not on file.      Review of Systems   Constitutional: Negative for chills and fever.   HENT: Positive for congestion and hoarse voice. Negative for sore throat.    Respiratory: Positive for cough, sputum production and shortness of breath.    Gastrointestinal: Negative for diarrhea and nausea.   Musculoskeletal: Negative for myalgias.   Neurological: Negative for headaches.          Objective:     /84   Pulse 86   Temp 37 °C (98.6 °F)   Ht 1.499 m (4' 11\")   Wt 73.5 kg (162 lb)   SpO2 98%   BMI 32.72 kg/m²      Physical Exam  Vitals signs and nursing note reviewed.   Constitutional:       General: She is not in acute distress.     Appearance: She is well-developed.   HENT:      Head: Normocephalic and atraumatic.      Right Ear: Ear canal and external ear normal. No middle ear effusion. Tympanic membrane is not injected or perforated.      Left Ear: Ear canal and external ear normal.  No middle ear effusion. Tympanic membrane is not injected or perforated.      Nose: Mucosal edema and congestion present.      Mouth/Throat:      Pharynx: No oropharyngeal exudate or posterior oropharyngeal erythema.   Eyes:      General:         Right eye: No discharge.         Left eye: No discharge.      Conjunctiva/sclera: Conjunctivae normal.   Neck:      Musculoskeletal: Normal range of motion and neck supple.   Cardiovascular:      Rate and Rhythm: Normal rate and regular rhythm.      Heart sounds: Normal heart sounds. No murmur.   Pulmonary:      Effort: Pulmonary effort is normal. No respiratory distress.      Breath sounds: Normal breath " sounds.   Musculoskeletal: Normal range of motion.      Comments: Normal movement of all 4 extremities.   Lymphadenopathy:      Cervical: No cervical adenopathy.      Upper Body:      Right upper body: No supraclavicular adenopathy.      Left upper body: No supraclavicular adenopathy.   Skin:     General: Skin is warm and dry.   Neurological:      Mental Status: She is alert and oriented to person, place, and time.      Gait: Gait normal.   Psychiatric:         Behavior: Behavior normal.         Thought Content: Thought content normal.                 Assessment/Plan:       1. Acute bacterial sinusitis  amoxicillin-clavulanate (AUGMENTIN) 875-125 MG Tab   2. Cough  benzonatate (TESSALON) 200 MG capsule     Differential diagnosis, natural history, supportive care, and indications for immediate follow-up discussed at length.

## 2024-10-21 NOTE — PROGRESS NOTES
Jose Steinberg is a 41 y.o.  at 36w5d here today for obstetrical visit.  Patient is without complaints.    She reports good fetal movement.  She denies vaginal bleeding.  She denies rupture of membranes.  She denies contractions.     has Supervision of other normal pregnancy, antepartum; AMA (advanced maternal age) multigravida 35+; Abnormal  AFP screen; and Carpal tunnel syndrome during pregnancy on her problem list.        A/P IUP at 36w5d  AFP done  1 hour glucola done  Rhogam opos  GBS today    F/U in 1 weeks    
Ob follow up. Good fetal movement. Pt c/o cold for 1 week. GBS today.   
no